# Patient Record
Sex: FEMALE | Race: WHITE | Employment: FULL TIME | URBAN - METROPOLITAN AREA
[De-identification: names, ages, dates, MRNs, and addresses within clinical notes are randomized per-mention and may not be internally consistent; named-entity substitution may affect disease eponyms.]

---

## 2017-07-14 ENCOUNTER — OFFICE VISIT (OUTPATIENT)
Dept: FAMILY MEDICINE | Facility: CLINIC | Age: 37
End: 2017-07-14
Payer: COMMERCIAL

## 2017-07-14 VITALS
HEART RATE: 60 BPM | TEMPERATURE: 98 F | OXYGEN SATURATION: 97 % | SYSTOLIC BLOOD PRESSURE: 105 MMHG | WEIGHT: 202 LBS | HEIGHT: 67 IN | BODY MASS INDEX: 31.71 KG/M2 | DIASTOLIC BLOOD PRESSURE: 67 MMHG

## 2017-07-14 DIAGNOSIS — N63.20 LUMP OF LEFT BREAST: Primary | ICD-10-CM

## 2017-07-14 DIAGNOSIS — Z86.006 HISTORY OF MELANOMA IN SITU: ICD-10-CM

## 2017-07-14 PROCEDURE — 99203 OFFICE O/P NEW LOW 30 MIN: CPT | Performed by: PHYSICIAN ASSISTANT

## 2017-07-14 NOTE — PROGRESS NOTES
SUBJECTIVE:                                                    Kimberli Rizo is a 36 year old female who presents to clinic today for the following health issues:      Breast lump, left. Noticed it this Monday  On Monday, she was hit in the chest with a lacrosse ball. This caused her to palpate her breast and she believes she felt a mass. She intermittently can feel something since.   She does not regularly do self breast exams.  She did have melanoma in the upper quadrants of the left breast- removed February 2016 - no reoccurrence since.   She will intermittently have pain in her left breast over the past year.   Denies any skin changes of the breast.   Denies weight changes.   Maternal grandmother diagnosed with breast cancer in 70's. No others with breast cancer.   She denies any other concerns.       Problem list and histories reviewed & adjusted, as indicated.  Additional history: as documented    Patient Active Problem List   Diagnosis     History of melanoma in situ     Past Surgical History:   Procedure Laterality Date     BIOPSY OF SKIN LESION       TONSILLECTOMY  1997       Social History   Substance Use Topics     Smoking status: Never Smoker     Smokeless tobacco: Never Used     Alcohol use 0.0 oz/week     0 Standard drinks or equivalent per week      Comment: Social     Family History   Problem Relation Age of Onset     Breast Cancer Maternal Grandmother      Heart Failure Paternal Grandmother      Thyroid Cancer Paternal Grandmother      Myocardial Infarction Paternal Grandfather      Hypertension Sister      Melanoma Other      Skin Cancer Other      BCC         No current outpatient prescriptions on file.     Allergies   Allergen Reactions     Seasonal Allergies      BP Readings from Last 3 Encounters:   07/14/17 105/67   05/05/16 105/61   02/03/16 125/70    Wt Readings from Last 3 Encounters:   07/14/17 202 lb (91.6 kg)   05/05/16 197 lb 6.4 oz (89.5 kg)   01/08/16 197 lb 6.4 oz (89.5 kg)     "                Reviewed and updated as needed this visit by clinical staff       Reviewed and updated as needed this visit by Provider         ROS:  Constitutional, breast, musculoskeletal and integumentary systems are negative, except as otherwise noted.    OBJECTIVE:     /67  Pulse 60  Temp 98  F (36.7  C) (Oral)  Ht 5' 7\" (1.702 m)  Wt 202 lb (91.6 kg)  SpO2 97%  BMI 31.64 kg/m2  Body mass index is 31.64 kg/(m^2).  GENERAL: healthy, alert and no distress  NECK: no adenopathy, no asymmetry, masses, or scars and thyroid normal to palpation  BREAST: normal without masses, tenderness or nipple discharge and no palpable axillary masses or adenopathy  MS: no gross musculoskeletal defects noted, no edema  SKIN: no suspicious lesions or rashes    Diagnostic Test Results:  none     ASSESSMENT/PLAN:       ICD-10-CM    1. Lump of left breast N63 MA Diagnostic Digital Bilateral   2. History of melanoma in situ Z87.898      Melanoma removed from left upper breast over 1 year ago. No reoccurrence since.     Patient Instructions   If you develop a lump or persistent pain, may call to schedule mammogram. At this time, I do not feel any specific lump in the area your indicate.       Call [phone] to schedule your diagnostic mammogram.      Ciarra King PA-C  [facility]  "

## 2017-07-14 NOTE — MR AVS SNAPSHOT
After Visit Summary   7/14/2017    Kimberli Rizo    MRN: [id number]           Patient Information     Date Of Birth          1980        Visit Information        Provider Department      7/14/2017 11:20 AM Ciarra King PA-C [facility]        Today's Diagnoses     Lump of left breast    -  1      Care Instructions    If you develop a lump or persistent pain, may call to schedule mammogram. At this time, I do not feel any specific lump in the area your indicate.       Call [phone] to schedule your diagnostic mammogram.          Follow-ups after your visit        Future tests that were ordered for you today     Open Future Orders        Priority Expected Expires Ordered    MA Diagnostic Digital Bilateral Routine  7/14/2018 7/14/2017            Who to contact     If you have questions or need follow up information about today's clinic visit or your schedule please contact [facility] directly at [phone].  Normal or non-critical lab and imaging results will be communicated to you by [employer] Motiongraph[employer]kshart, letter or phone within 4 business days after the clinic has received the results. If you do not hear from us within 7 days, please contact the clinic through [employer] or phone. If you have a critical or abnormal lab result, we will notify you by phone as soon as possible.  Submit refill requests through [employer] or call your pharmacy and they will forward the refill request to us. Please allow 3 business days for your refill to be completed.          Additional Information About Your Visit        SD Motiongraphikshart Information     [employer] gives you secure access to your electronic health record. If you see a primary care provider, you can also send messages to your care team and make appointments. If you have questions, please call your primary care clinic.  If you do not have a primary care provider, please call [phone] and they will assist you.        Care EveryWhere  "ID     This is your Care EveryWhere ID. This could be used by other organizations to access your [facility] medical records  [id number]        Your Vitals Were     Pulse Temperature Height Pulse Oximetry BMI (Body Mass Index)       60 98  F (36.7  C) (Oral) 5' 7\" (1.702 m) 97% 31.64 kg/m2        Blood Pressure from Last 3 Encounters:   07/14/17 105/67   05/05/16 105/61   02/03/16 125/70    Weight from Last 3 Encounters:   07/14/17 202 lb (91.6 kg)   05/05/16 197 lb 6.4 oz (89.5 kg)   01/08/16 197 lb 6.4 oz (89.5 kg)                 Today's Medication Changes          These changes are accurate as of: 7/14/17 11:44 AM.  If you have any questions, ask your nurse or doctor.               Stop taking these medicines if you haven't already. Please contact your care team if you have questions.     order for DME   Stopped by:  Ciarra King PA-C                    Primary Care Provider Office Phone # Fax #    [facility] [phone]       No address on file        Equal Access to Services     DOMINGUEZ MELGAR : Hadii davey montezo Sochristal, waaxda luqadaha, qaybta kaalmada adeegyada, imtiaz christian. So [facility] [phone].    ATENCIÓN: Si habla español, tiene a negrete disposición servicios gratuitos de asistencia lingüística. [facility] [phone].    We comply with applicable federal civil rights laws and [place] laws. We do not discriminate on the basis of race, color, national origin, age, disability sex, sexual orientation or gender identity.            Thank you!     Thank you for choosing [facility]  for your care. Our goal is always to provide you with excellent care. Hearing back from our patients is one way we can continue to improve our services. Please take a few minutes to complete the written survey that you may receive in the mail after your visit with us. Thank you!             Your Updated Medication List - Protect others around you: Learn how " to safely use, store and throw away your medicines at www.disposemymeds.org.      Notice  As of 7/14/2017 11:44 AM    You have not been prescribed any medications.

## 2017-07-14 NOTE — NURSING NOTE
"Chief Complaint   Patient presents with     Breast Mass       Initial /67  Pulse 60  Temp 98  F (36.7  C) (Oral)  Ht 5' 7\" (1.702 m)  Wt 202 lb (91.6 kg)  SpO2 97%  BMI 31.64 kg/m2 Estimated body mass index is 31.64 kg/(m^2) as calculated from the following:    Height as of this encounter: 5' 7\" (1.702 m).    Weight as of this encounter: 202 lb (91.6 kg).  Medication Reconciliation: complete     Nyla Zapata cma      "

## 2017-07-14 NOTE — PATIENT INSTRUCTIONS
If you develop a lump or persistent pain, may call to schedule mammogram. At this time, I do not feel any specific lump in the area your indicate.       Call [phone] to schedule your diagnostic mammogram.

## 2017-08-02 ENCOUNTER — OFFICE VISIT (OUTPATIENT)
Dept: URGENT CARE | Facility: URGENT CARE | Age: 37
End: 2017-08-02
Payer: COMMERCIAL

## 2017-08-02 VITALS
OXYGEN SATURATION: 99 % | DIASTOLIC BLOOD PRESSURE: 76 MMHG | HEART RATE: 72 BPM | WEIGHT: 204 LBS | BODY MASS INDEX: 31.95 KG/M2 | RESPIRATION RATE: 15 BRPM | SYSTOLIC BLOOD PRESSURE: 116 MMHG | TEMPERATURE: 97.4 F

## 2017-08-02 DIAGNOSIS — R07.2 PRECORDIAL PAIN: Primary | ICD-10-CM

## 2017-08-02 DIAGNOSIS — R10.13 ABDOMINAL PAIN, EPIGASTRIC: ICD-10-CM

## 2017-08-02 PROCEDURE — 99213 OFFICE O/P EST LOW 20 MIN: CPT | Performed by: FAMILY MEDICINE

## 2017-08-02 PROCEDURE — 93000 ELECTROCARDIOGRAM COMPLETE: CPT | Performed by: FAMILY MEDICINE

## 2017-08-02 RX ORDER — LORATADINE 10 MG/1
TABLET ORAL
COMMUNITY
Start: 2004-12-03 | End: 2021-06-09

## 2017-08-02 NOTE — NURSING NOTE
"Chief Complaint   Patient presents with     Abdominal Pain     c/o nausea, stomach pain and loose stool since 7/29/17, now having pain on left side        Initial /76  Pulse 72  Temp 97.4  F (36.3  C) (Oral)  Resp 15  Wt 204 lb (92.5 kg)  SpO2 99%  Breastfeeding? No  BMI 31.95 kg/m2 Estimated body mass index is 31.95 kg/(m^2) as calculated from the following:    Height as of 7/14/17: 5' 7\" (1.702 m).    Weight as of this encounter: 204 lb (92.5 kg).  Medication Reconciliation: complete   Burt Phillips MA      "

## 2017-08-02 NOTE — MR AVS SNAPSHOT
After Visit Summary   8/2/2017    Kimberli Rizo    MRN: [id number]           Patient Information     Date Of Birth          1980        Visit Information        Provider Department      8/2/2017 5:20 PM Odalis Clifford MD [facility]        Today's Diagnoses     Precordial pain    -  1    Abdominal pain, epigastric          Care Instructions    Concern for need for rule out acute cardiorespiratory process and also rule out pancreatitis.           Follow-ups after your visit        Who to contact     If you have questions or need follow up information about today's clinic visit or your schedule please contact [facility] directly at [phone].  Normal or non-critical lab and imaging results will be communicated to you by HEXIOhart, letter or phone within 4 business days after the clinic has received the results. If you do not hear from us within 7 days, please contact the clinic through HEXIOhart or phone. If you have a critical or abnormal lab result, we will notify you by phone as soon as possible.  Submit refill requests through [employer] or call your pharmacy and they will forward the refill request to us. Please allow 3 business days for your refill to be completed.          Additional Information About Your Visit        MyChart Information     [employer] gives you secure access to your electronic health record. If you see a primary care provider, you can also send messages to your care team and make appointments. If you have questions, please call your primary care clinic.  If you do not have a primary care provider, please call [phone] and they will assist you.        Care EveryWhere ID     This is your Care EveryWhere ID. This could be used by other organizations to access your [facility] medical records  [id number]        Your Vitals Were     Pulse Temperature Respirations Pulse Oximetry Breastfeeding? BMI (Body Mass Index)    72 97.4  F (36.3  C)  (Oral) 15 99% No 31.95 kg/m2       Blood Pressure from Last 3 Encounters:   08/02/17 116/76   07/14/17 105/67   05/05/16 105/61    Weight from Last 3 Encounters:   08/02/17 204 lb (92.5 kg)   07/14/17 202 lb (91.6 kg)   05/05/16 197 lb 6.4 oz (89.5 kg)              We Performed the Following     EKG 12-lead complete w/read - Clinics        Primary Care Provider Office Phone # Fax #    [facility] [phone]       No address on file        Equal Access to Services     MEGA [facility] HospitalCAYLA : Hadii davey leiva Sochristal, wagregda gris, seraybcar kaalmada gerry, imtiaz kelly . So [facility] [phone].    ATENCIÓN: Si habla español, tiene a negrete disposición servicios gratuitos de asistencia lingüística. [facility] [phone].    We comply with applicable federal civil rights laws and [place] laws. We do not discriminate on the basis of race, color, national origin, age, disability sex, sexual orientation or gender identity.            Thank you!     Thank you for choosing [facility]  for your care. Our goal is always to provide you with excellent care. Hearing back from our patients is one way we can continue to improve our services. Please take a few minutes to complete the written survey that you may receive in the mail after your visit with us. Thank you!             Your Updated Medication List - Protect others around you: Learn how to safely use, store and throw away your medicines at www.disposemymeds.org.          This list is accurate as of: 8/2/17  5:54 PM.  Always use your most recent med list.                   Brand Name Dispense Instructions for use Diagnosis    CLARITIN 10 MG tablet   Generic drug:  loratadine      PRN- Take one tablet daily for allergies

## 2017-08-02 NOTE — PROGRESS NOTES
SUBJECTIVE:                                                    Kimberli Rizo is a 36 year old female who presents to clinic today for the following health issues:      Abdominal Pain and chest pain      Duration: 7/29/17    Description (location/character/radiation): nausea/pain radiates to chest/neck on left side       Associated flank pain: None    Intensity:  moderate    Accompanying signs and symptoms:        Fever/Chills: no        Gas/Bloating: YES       Nausea/vomitting: YES - some nausea.        Diarrhea: no loose stool       Dysuria or Hematuria: no     History (previous similar pain/trauma/previous testing): none    Precipitating or alleviating factors:       Pain worse with eating/BM/urination: no       Pain relieved by BM: no     Therapies tried and outcome: None    LMP:  not applicable    5 days ago stomach started hurting left upper quadrant. Thought it was justfrom eating some jalapenos  Patient tried tums and peptobismal no relief    Past couple of days has still persistedly uncomfortable  Now the whole left side to be hurting even up to her chest now     Denies any possibility of pregnancy declined a pregnancy test  Compa has had a vasectomy.     Constant   Sometimes is more of a stinging in stomach sometimes in the chest  Not really sure if chest pain is exertional or not.   When she eats a little bit seems to make it better for a moment but then gets worse again  Not really sure what makes things better or worse.   diarrhea: no   urinary symptoms:  none   flank pain:  none  fever or chills:  none  weight loss or constitutional symptoms: none  melena, hematochezia, or hematemesis: none  Problem list, Medication list, Allergies, and Medical/Social/Surgical histories reviewed in EPIC and updated as appropriate.      ROS:  General: negative for fever  Resp: negative for chest pain   CV: negative for chest pain  ABD: as above  : negative for dysuria  Neurologic:negative for Headache  Psych:  denies any thoughts of harming self or others.     Constitutional, HEENT, cardiovascular, pulmonary, GI, , musculoskeletal, neuro, skin, endocrine and psych systems are negative, except as otherwise noted.      OBJECTIVE:/76  Pulse 72  Temp 97.4  F (36.3  C) (Oral)  Resp 15  Wt 204 lb (92.5 kg)  SpO2 99%  Breastfeeding? No  BMI 31.95 kg/m2   General:   awake, alert, and cooperative.  NAD.   Head: Normocephalic, atraumatic.  Eyes: Conjunctiva clear, non icteric. YAMIL  Heart: Regular rate and rhythm. No murmur.  Lungs: Chest is clear; no wheezes or rales.  ABD: soft, no tenderness to palpation , no rigidity, guarding or rebound , bowel sounds intact  RECTAL: declined/deferred   Neuro: Alert and oriented - normal speech.       Diagnostic Test Results:  EKG - negative to my review  ASSESSMENT:    ICD-10-CM    1. Precordial pain R07.2 EKG 12-lead complete w/read - Clinics   2. Abdominal pain, epigastric R10.13          PLAN:      Recommend going to the ER for complete rule out  Need rule out acute cardiorespiratory process and rule out pancreatitis.   Patient states pain is moderate to severe although patient seems comfortable here in clinic  If tests negative consider starting PPI.   I advised and recommended ambulance transfer. Patient refused ambulance transfer. Warned about risks to patient and to others on the road and this was discussed in detail however patient still refused.   Patient plans to go to [facility]. A copy of EKG and patient plan given to patient.     Odalis Clifford MD

## 2018-08-10 ENCOUNTER — OFFICE VISIT (OUTPATIENT)
Dept: ORTHOPEDICS | Facility: CLINIC | Age: 38
End: 2018-08-10
Payer: COMMERCIAL

## 2018-08-10 ENCOUNTER — RADIANT APPOINTMENT (OUTPATIENT)
Dept: GENERAL RADIOLOGY | Facility: CLINIC | Age: 38
End: 2018-08-10
Attending: PEDIATRICS
Payer: COMMERCIAL

## 2018-08-10 VITALS
BODY MASS INDEX: 30.61 KG/M2 | WEIGHT: 195 LBS | HEIGHT: 67 IN | SYSTOLIC BLOOD PRESSURE: 104 MMHG | DIASTOLIC BLOOD PRESSURE: 70 MMHG

## 2018-08-10 DIAGNOSIS — M25.552 PAIN IN JOINT INVOLVING LEFT PELVIC REGION AND THIGH: ICD-10-CM

## 2018-08-10 DIAGNOSIS — M25.552 LEFT HIP PAIN: Primary | ICD-10-CM

## 2018-08-10 PROCEDURE — 99214 OFFICE O/P EST MOD 30 MIN: CPT | Performed by: PEDIATRICS

## 2018-08-10 PROCEDURE — 73502 X-RAY EXAM HIP UNI 2-3 VIEWS: CPT

## 2018-08-10 NOTE — PROGRESS NOTES
Sports Medicine Clinic Visit    PCP: New Shearer, Renetta    Kimberli Rizo is a 37 year old female who is seen  as a self referral presenting with left hip pain.    Injury: pain/ limping started after running 1/2 [place].  Had some bilateral lateral hip pain past few miles; race pace was a little faster than training.  Noted this pain after the race. Was limping. Next morning with getting out of bed significantly more pain. Used crutches for part of day. Later that day had massage, still limping, was walking a little better.      Location of Pain: left anterior hip was start of pain, now whole hip is painful  Duration of Pain: 7/28/18 or 2 week(s)  Rating of Pain at worst: 8/10  Rating of Pain Currently: 5/10  Symptoms are better with: Ibu, Heat, Rest and Massage with short term relief (about 30 mins)  Symptoms are worse with: flexion of hip, running and standing  Additional Features:   Positive: catching and weakness   Negative: swelling, bruising, locking and numbness  Other evaluation and/or treatments so far consists of: stretching, hot baths, Epson salts, foam rolls,   Prior History of related problems: IT Band issues at knee in high school,    Social History: hoping to run TCM, first marathon,     Review of Systems  Musculoskeletal: as above  Remainder of review of systems is negative including constitutional, CV, pulmonary, GI, Skin and Neurologic except as noted in HPI or medical history.    Past Medical History:   Diagnosis Date     Malignant melanoma (H)      NO ACTIVE PROBLEMS      Past Surgical History:   Procedure Laterality Date     BIOPSY OF SKIN LESION       TONSILLECTOMY  1997     Family History   Problem Relation Age of Onset     Breast Cancer Maternal Grandmother      Heart Failure Paternal Grandmother      Thyroid Cancer Paternal Grandmother      Myocardial Infarction Paternal Grandfather      Hypertension Sister      Melanoma Other      Skin Cancer Other      BCC     Social History  "    Social History     Marital status:      Spouse name: N/A     Number of children: N/A     Years of education: N/A     Occupational History     Not on file.     Social History Main Topics     Smoking status: Never Smoker     Smokeless tobacco: Never Used     Alcohol use 0.0 oz/week     0 Standard drinks or equivalent per week      Comment: Social     Drug use: No     Sexual activity: Yes     Partners: Male     Birth control/ protection: Male Surgical      Comment:  Vas     Other Topics Concern     Not on file     Social History Narrative       Objective  /70 (BP Location: Right arm, Patient Position: Chair, Cuff Size: Adult Regular)  Ht 5' 7\" (1.702 m)  Wt 195 lb (88.5 kg)  BMI 30.54 kg/m2      GENERAL APPEARANCE: healthy, alert and no distress   GAIT: antalgic  SKIN: no suspicious lesions or rashes  NEURO: Normal strength and tone, mentation intact and speech normal  PSYCH:  mentation appears normal and affect normal/bright  HEENT: no scleral icterus  CV: no extremity edema  RESP: nonlabored breathing    Left hip exam    Inspection:        no edema or ecchymosis in hip area    ROM:       Full active and passive ROM   Mild discomfort end of ER    Strength:        flexion 4+/5       abduction 4/5       adduction 4+/5  Min pain with flexion    Tender:        Pubis left    Non Tender:        remainder of hip area    Sensation:        grossly intact in hip and thigh    Skin:       well perfused       capillary refill brisk    Special Tests:        neg (-) CARISSA       neg (-) FADIR       neg (-) scour  Quad, hip flexor stretching without change in pain          Radiology  Visualized radiographs of left hip/pelvis obtained today, and reviewed the images with the patient.  Impression: no clear bony injury.      Assessment:  1. Left hip pain        Plan:  Discussed the assessment with the patient. Pain primarily with WB; consider bony stress injury. Other consideration is soft tissue, " muscular/tendinous. Less likely left hip joint source given exam.  Icing, over-the-counter medication as needed.  Discussed acetaminophen preferred over ibuprofen at this point, with considerations noted.  Discussed rehab, rest from running, and symptom management over the next couple of weeks; versus additional imaging to evaluate for stress injury.  This is her planned first marathon, and with under 2 months until the race, will obtain MRI of the area to evaluate for bony stress injury.  If there is one, I do not anticipate she will be able to run the race.  If no bone or joint concerns, then may be able to rehabilitate and still try to participate pending her course.  Avoid weightbearing, with use of crutches.  MRI of the left hip next  Follow up: call with results of MRI.  Questions answered. The patient indicates understanding of these issues and agrees with the plan.    Alistair Doty DO, CAQ          Disclaimer: This note consists of symbols derived from keyboarding, dictation and/or voice recognition software. As a result, there may be errors in the script that have gone undetected. Please consider this when interpreting information found in this chart.

## 2018-08-10 NOTE — MR AVS SNAPSHOT
After Visit Summary   8/10/2018    Kimberli Rizo    MRN: [id number]           Patient Information     Date Of Birth          1980        Visit Information        Provider Department      8/10/2018 11:20 AM Alistair Doty, DO [facility]        Today's Diagnoses     Left hip pain    -  1      Care Instructions     Advanced imaging is done by appointment. Some insurance require a prior authorization to be completed which may delay the time until you are able to schedule your appointment. Please call [facility]: [phone] to schedule your MRI.  Depending on your availability you can usually schedule within the next 1-2 days.    The clinic will call you with results, if you have not heard from the clinic within 3-4 days following your MRI please contact us at the number listed below.     If you have any further questions for your physician or physician s care team you can call [phone] and use option 3 to leave a voice message. Calls received during business hours will be returned same day.                  Follow-ups after your visit        Future tests that were ordered for you today     Open Future Orders        Priority Expected Expires Ordered    MR Hip Left w/o Contrast Routine  8/10/2019 8/10/2018            Who to contact     If you have questions or need follow up information about today's clinic visit or your schedule please contact [facility] directly at [phone].  Normal or non-critical lab and imaging results will be communicated to you by MyChart, letter or phone within 4 business days after the clinic has received the results. If you do not hear from us within 7 days, please contact the clinic through MyChart or phone. If you have a critical or abnormal lab result, we will notify you by phone as soon as possible.  Submit refill requests through [employer] or call your pharmacy  "and they will forward the refill request to us. Please allow 3 business days for your refill to be completed.          Additional Information About Your Visit        [employer] Information     [employer] gives you secure access to your electronic health record. If you see a primary care provider, you can also send messages to your care team and make appointments. If you have questions, please call your primary care clinic.  If you do not have a primary care provider, please call [phone] and they will assist you.        Care EveryWhere ID     This is your Care EveryWhere ID. This could be used by other organizations to access your [facility] records  [id number]        Your Vitals Were     Height BMI (Body Mass Index)                5' 7\" (1.702 m) 30.54 kg/m2           Blood Pressure from Last 3 Encounters:   08/10/18 104/70   08/02/17 116/76   07/14/17 105/67    Weight from Last 3 Encounters:   08/10/18 195 lb (88.5 kg)   08/02/17 204 lb (92.5 kg)   07/14/17 202 lb (91.6 kg)               Primary Care Provider Office Phone # Fax #    Clinic  [place] [phone]       No address on file        Equal Access to Services     DOMINGUEZ MELGAR AH: Hadii davey montezo Soruthannali, waaxda luqadaha, qaybta kaalmada adeegyada, imtiaz christian. So [facility] [phone].    ATENCIÓN: Si habla español, tiene a negrete disposición servicios gratuitos de asistencia lingüística. Llame al [phone].    We comply with applicable federal civil rights laws and [place] laws. We do not discriminate on the basis of race, color, national origin, age, disability, sex, sexual orientation, or gender identity.            Thank you!     Thank you for choosing [facility]  for your care. Our goal is always to provide you with excellent care. Hearing back from our patients is one way we can continue to improve our services. Please take a few minutes to complete the written survey " that you may receive in the mail after your visit with us. Thank you!             Your Updated Medication List - Protect others around you: Learn how to safely use, store and throw away your medicines at www.disposemymeds.org.          This list is accurate as of 8/10/18 12:36 PM.  Always use your most recent med list.                   Brand Name Dispense Instructions for use Diagnosis    CLARITIN 10 MG tablet   Generic drug:  loratadine      PRN- Take one tablet daily for allergies

## 2018-08-10 NOTE — LETTER
8/10/2018         RE: Kimberli Rizo  [id number] [address] [facility] [id number]        Dear Colleague,    Thank you for referring your patient, Kimberli Rizo, to the [facility]. Please see a copy of my visit note below.    Sports Medicine Clinic Visit    PCP: [place], [facility]    Kimberli Rizo is a 37 year old female who is seen  as a self referral presenting with left hip pain.    Injury: pain/ limping started after running 1/2 [place].  Had some bilateral lateral hip pain past few miles; race pace was a little faster than training.  Noted this pain after the race. Was limping. Next morning with getting out of bed significantly more pain. Used crutches for part of day. Later that day had massage, still limping, was walking a little better.      Location of Pain: left anterior hip was start of pain, now whole hip is painful  Duration of Pain: 7/28/18 or 2 week(s)  Rating of Pain at worst: 8/10  Rating of Pain Currently: 5/10  Symptoms are better with: Ibu, Heat, Rest and Massage with short term relief (about 30 mins)  Symptoms are worse with: flexion of hip, running and standing  Additional Features:   Positive: catching and weakness   Negative: swelling, bruising, locking and numbness  Other evaluation and/or treatments so far consists of: stretching, hot baths, Epson salts, foam rolls,   Prior History of related problems: IT Band issues at knee in high school,    Social History: hoping to run TCM, first marathon,     Review of Systems  Musculoskeletal: as above  Remainder of review of systems is negative including constitutional, CV, pulmonary, GI, Skin and Neurologic except as noted in HPI or medical history.    Past Medical History:   Diagnosis Date     Malignant melanoma (H)      NO ACTIVE PROBLEMS      Past Surgical History:   Procedure Laterality Date     BIOPSY OF SKIN LESION       TONSILLECTOMY  1997     Family History   Problem Relation Age of Onset      "Breast Cancer Maternal Grandmother      Heart Failure Paternal Grandmother      Thyroid Cancer Paternal Grandmother      Myocardial Infarction Paternal Grandfather      Hypertension Sister      Melanoma Other      Skin Cancer Other      BCC     Social History     Social History     Marital status:      Spouse name: N/A     Number of children: N/A     Years of education: N/A     Occupational History     Not on file.     Social History Main Topics     Smoking status: Never Smoker     Smokeless tobacco: Never Used     Alcohol use 0.0 oz/week     0 Standard drinks or equivalent per week      Comment: Social     Drug use: No     Sexual activity: Yes     Partners: Male     Birth control/ protection: Male Surgical      Comment:  Vas     Other Topics Concern     Not on file     Social History Narrative       Objective  /70 (BP Location: Right arm, Patient Position: Chair, Cuff Size: Adult Regular)  Ht 5' 7\" (1.702 m)  Wt 195 lb (88.5 kg)  BMI 30.54 kg/m2      GENERAL APPEARANCE: healthy, alert and no distress   GAIT: antalgic  SKIN: no suspicious lesions or rashes  NEURO: Normal strength and tone, mentation intact and speech normal  PSYCH:  mentation appears normal and affect normal/bright  HEENT: no scleral icterus  CV: no extremity edema  RESP: nonlabored breathing    Left hip exam    Inspection:        no edema or ecchymosis in hip area    ROM:       Full active and passive ROM   Mild discomfort end of ER    Strength:        flexion 4+/5       abduction 4/5       adduction 4+/5  Min pain with flexion    Tender:        Pubis left    Non Tender:        remainder of hip area    Sensation:        grossly intact in hip and thigh    Skin:       well perfused       capillary refill brisk    Special Tests:        neg (-) CARISSA       neg (-) FADIR       neg (-) scour  Quad, hip flexor stretching without change in pain          Radiology  Visualized radiographs of left hip/pelvis obtained today, and reviewed " the images with the patient.  Impression: no clear bony injury.      Assessment:  1. Left hip pain        Plan:  Discussed the assessment with the patient. Pain primarily with WB; consider bony stress injury. Other consideration is soft tissue, muscular/tendinous. Less likely left hip joint source given exam.  Icing, over-the-counter medication as needed.  Discussed acetaminophen preferred over ibuprofen at this point, with considerations noted.  Discussed rehab, rest from running, and symptom management over the next couple of weeks; versus additional imaging to evaluate for stress injury.  This is her planned first marathon, and with under 2 months until the race, will obtain MRI of the area to evaluate for bony stress injury.  If there is one, I do not anticipate she will be able to run the race.  If no bone or joint concerns, then may be able to rehabilitate and still try to participate pending her course.  Avoid weightbearing, with use of crutches.  MRI of the left hip next  Follow up: call with results of MRI.  Questions answered. The patient indicates understanding of these issues and agrees with the plan.    Alistair Doty DO, CAQ          Disclaimer: This note consists of symbols derived from keyboarding, dictation and/or voice recognition software. As a result, there may be errors in the script that have gone undetected. Please consider this when interpreting information found in this chart.        Again, thank you for allowing me to participate in the care of your patient.        Sincerely,        Alistair Doty DO

## 2018-08-10 NOTE — PATIENT INSTRUCTIONS
Advanced imaging is done by appointment. Some insurance require a prior authorization to be completed which may delay the time until you are able to schedule your appointment. Please call [place]: [phone] to schedule your MRI.  Depending on your availability you can usually schedule within the next 1-2 days.    The clinic will call you with results, if you have not heard from the clinic within 3-4 days following your MRI please contact us at the number listed below.     If you have any further questions for your physician or physician s care team you can call [phone] and use option 3 to leave a voice message. Calls received during business hours will be returned same day.

## 2018-08-16 ENCOUNTER — RADIANT APPOINTMENT (OUTPATIENT)
Dept: MRI IMAGING | Facility: CLINIC | Age: 38
End: 2018-08-16
Attending: PEDIATRICS
Payer: COMMERCIAL

## 2018-08-16 DIAGNOSIS — M25.552 LEFT HIP PAIN: ICD-10-CM

## 2018-08-16 PROCEDURE — 73721 MRI JNT OF LWR EXTRE W/O DYE: CPT | Mod: TC

## 2018-08-17 ENCOUNTER — TELEPHONE (OUTPATIENT)
Dept: ORTHOPEDICS | Facility: CLINIC | Age: 38
End: 2018-08-17

## 2018-08-17 NOTE — TELEPHONE ENCOUNTER
Early left femoral neck stress injury; no clear stress fracture. Appears to be more on compression side of bone, so this is lower risk but still susceptible to further injury/fracture if not addressed.  Unfortunately, advise no running. She may do low/no impact cross training activities (e.g., biking) if pain free. At this point I don't think she'll be able to run the marathon this year. Also, I don't think she needs crutches at this point, if walking comfortably.  Would recheck in 2-3 weeks, and obtain plain films left hip (2 views ok, AP and lateral). Can refer to PT at any point as well, now or wait until recheck.  Thanks.  Alistair Doty, , CAQ

## 2018-08-17 NOTE — TELEPHONE ENCOUNTER
Results for orders placed or performed in visit on 08/16/18   MR Hip Left w/o Contrast    Narrative    MR HIP LEFT WITHOUT CONTRAST 8/16/2018 8:02 AM    HISTORY:  Evaluate for bony stress injury. Left hip pain.    TECHNIQUE:  Multiplanar, multisequence without contrast.    FINDINGS:   Osseous and Cartilaginous Structures:  There is mild edema along the  medial aspect of the left femoral neck, consistent with stress  reaction or developing stress fracture. No discrete fracture line is  seen at this time. The edema is seen on series 2 images 25-30. No  significant hip osteoarthritis. Small focus of subchondral cystic  change at the lateral aspect of the left sacroiliac joint.    Acetabular Labrum: No juxtaacetabular cyst.  No obvious labral tear is  appreciated, allowing for the large FOV technique.  If indicated  clinically, MR arthrography would be considered the study of choice in  this regard.    Hip joint space:  No significant overall joint effusion.     Trochanteric and Iliopsoas Bursae: No fluid collection in the  trochanteric or iliopsoas bursae.    Common Hamstring Tendon: No evidence of tear or significant  tendinosis.    Additional Findings: The gluteus medius and minimus tendons appear  unremarkable.      Impression    IMPRESSION:  Left femoral neck stress reaction versus developing  stress fracture.    WOODY BAIN MD

## 2018-08-17 NOTE — TELEPHONE ENCOUNTER
Called and spoke with patient.  Discussed results and recommendations.  She is agreeable to rest from running.  Did discuss the use of PT.  Will wait for now.  Will follow up in 2-3 weeks.  Kassandra Liriano MS ATC

## 2018-08-20 ENCOUNTER — MYC MEDICAL ADVICE (OUTPATIENT)
Dept: ORTHOPEDICS | Facility: CLINIC | Age: 38
End: 2018-08-20

## 2018-09-05 ENCOUNTER — TRANSFERRED RECORDS (OUTPATIENT)
Dept: HEALTH INFORMATION MANAGEMENT | Facility: CLINIC | Age: 38
End: 2018-09-05

## 2020-02-24 ENCOUNTER — HEALTH MAINTENANCE LETTER (OUTPATIENT)
Age: 40
End: 2020-02-24

## 2020-12-13 ENCOUNTER — HEALTH MAINTENANCE LETTER (OUTPATIENT)
Age: 40
End: 2020-12-13

## 2021-04-08 ENCOUNTER — E-VISIT (OUTPATIENT)
Dept: URGENT CARE | Facility: URGENT CARE | Age: 41
End: 2021-04-08
Payer: COMMERCIAL

## 2021-04-08 ENCOUNTER — ANCILLARY PROCEDURE (OUTPATIENT)
Dept: GENERAL RADIOLOGY | Facility: CLINIC | Age: 41
End: 2021-04-08
Attending: FAMILY MEDICINE
Payer: COMMERCIAL

## 2021-04-08 ENCOUNTER — OFFICE VISIT (OUTPATIENT)
Dept: URGENT CARE | Facility: URGENT CARE | Age: 41
End: 2021-04-08
Payer: COMMERCIAL

## 2021-04-08 VITALS
OXYGEN SATURATION: 95 % | TEMPERATURE: 100.8 F | DIASTOLIC BLOOD PRESSURE: 81 MMHG | SYSTOLIC BLOOD PRESSURE: 120 MMHG | RESPIRATION RATE: 18 BRPM | HEART RATE: 106 BPM

## 2021-04-08 DIAGNOSIS — U07.1 CLINICAL DIAGNOSIS OF COVID-19: Primary | ICD-10-CM

## 2021-04-08 DIAGNOSIS — J18.9 PNEUMONIA OF RIGHT LOWER LOBE DUE TO INFECTIOUS ORGANISM: ICD-10-CM

## 2021-04-08 DIAGNOSIS — U07.1 2019 NOVEL CORONAVIRUS DISEASE (COVID-19): Primary | ICD-10-CM

## 2021-04-08 PROCEDURE — 71046 X-RAY EXAM CHEST 2 VIEWS: CPT | Performed by: RADIOLOGY

## 2021-04-08 PROCEDURE — 99214 OFFICE O/P EST MOD 30 MIN: CPT | Performed by: FAMILY MEDICINE

## 2021-04-08 RX ORDER — AZITHROMYCIN 250 MG/1
TABLET, FILM COATED ORAL
Qty: 6 TABLET | Refills: 0 | Status: SHIPPED | OUTPATIENT
Start: 2021-04-08 | End: 2021-06-09

## 2021-04-08 RX ORDER — AMOXICILLIN 875 MG
875 TABLET ORAL 2 TIMES DAILY
Qty: 20 TABLET | Refills: 0 | Status: SHIPPED | OUTPATIENT
Start: 2021-04-08 | End: 2021-04-18

## 2021-04-08 RX ORDER — ALBUTEROL SULFATE 90 UG/1
2 AEROSOL, METERED RESPIRATORY (INHALATION) ONCE
Status: DISCONTINUED | OUTPATIENT
Start: 2021-04-08 | End: 2022-03-29

## 2021-04-08 ASSESSMENT — PAIN SCALES - GENERAL: PAINLEVEL: NO PAIN (0)

## 2021-04-08 NOTE — PATIENT INSTRUCTIONS
Dear Kimberli Rizo,    We are sorry you are not feeling well. Based on the responses you provided, it is recommended that you be seen in-person in urgent care so we can better evaluate your symptoms. Please click here to find the nearest urgent care location to you.   You will not be charged for this Visit. Thank you for trusting us with your care.    Raman Pepe PA-C

## 2021-04-08 NOTE — PROGRESS NOTES
Chief complaint: worsening cough     Symptoms started about 2 weeks initially was just a back ache and body aches and fatigue  Then runny nose itchy ears  Was diagnosed with covid  Then had more cold symptoms    Over the past 2 days noticed that she is having coughing fits  If she breathes in deep coughing fits  Exerting coughing fits    Low grade fevers in the beginning   Yesterday didn't have a fever  But then today has fevers       Allergies   Allergen Reactions     Seasonal Allergies        Past Medical History:   Diagnosis Date     Malignant melanoma (H)      NO ACTIVE PROBLEMS        loratadine (CLARITIN) 10 MG tablet, PRN- Take one tablet daily for allergies    No current facility-administered medications on file prior to visit.       Social History     Tobacco Use     Smoking status: Never Smoker     Smokeless tobacco: Never Used   Substance Use Topics     Alcohol use: Yes     Alcohol/week: 0.0 standard drinks     Comment: Social     Drug use: No       ROS:  review of systems negative except for noted above.   No thoughts of harming self or others.     OBJECTIVE:  /81   Pulse 106   Temp 100.8  F (38.2  C) (Tympanic)   Resp 18   SpO2 95%    General:   awake, alert, and cooperative.  NAD.   Head: Normocephalic, atraumatic.  Eyes: Conjunctiva clear,   Heart: Regular rate and rhythm. No murmur.  Lungs: Chest is clear; bronchial breath sounds bilaterally   Abdomen: soft non-tender.  Neuro: Alert and oriented - normal speech.  MS: Using extremities freely  PSYCH:  Normal affect, normal speech  SKIN: no obvious rashes    Diagnostic Test Results:  Results for orders placed or performed in visit on 04/08/21 (from the past 24 hour(s))   XR Chest 2 Views    Narrative    CHEST TWO VIEWS  4/8/2021 2:04 PM     HISTORY: 2019 novel coronavirus disease (COVID-19).    COMPARISON: None.      Impression    IMPRESSION: PA and lateral views of the chest. A few patchy airspace  opacities are noted at the right lung base.  Lungs are otherwise clear.  Heart is normal in size. No effusions are evident. No pneumothorax.    ELVA KUNZ MD       ASSESSMENT:    ICD-10-CM    1. 2019 novel coronavirus disease (COVID-19)  U07.1 XR Chest 2 Views     amoxicillin (AMOXIL) 875 MG tablet     azithromycin (ZITHROMAX) 250 MG tablet     albuterol (PROAIR HFA/PROVENTIL HFA/VENTOLIN HFA) 108 (90 Base) MCG/ACT inhaler 2 puff   2. Pneumonia of right lower lobe due to infectious organism  J18.9 amoxicillin (AMOXIL) 875 MG tablet     azithromycin (ZITHROMAX) 250 MG tablet       PLAN:   Urgent Care course:    Patient given 2 puffs of albuterol here minimal relief.  Sent for CHEST XRAY  To my personal review I see right lower lobe pneumonia  Prescribed with amoxicillin and zithromax  May still try albuterol as needed cough.  If with any symptoms of chest pain or shortness of breath, lightheadedness, palpitations, feeling like passing out or change and worsening in the quality of your symptoms, please proceed to ER. Recommend follow up with PCP in a few days for re-evaluation.   Continue self isolation until symptoms improve for 24 hours.  She declines ER at this time. Oxygen level is good and patient feels ok to monitor at home  Alarm signs or symptoms discussed, if present recommend go to ER     Abnormal chest xray recommend recheck in 6-8 weeks to assure resolution. Differentials discussed with patient and patient voiced understanding  She states she will discuss with her primary care provider and follow up with a primary care provider       Advised about symptoms which might herald more serious problems.        Odalis Clifford MD

## 2021-04-09 ENCOUNTER — TRANSFERRED RECORDS (OUTPATIENT)
Dept: HEALTH INFORMATION MANAGEMENT | Facility: CLINIC | Age: 41
End: 2021-04-09

## 2021-04-11 LAB
ALT SERPL-CCNC: 36 IU/L (ref 12–68)
AST SERPL-CCNC: 33 U/L (ref 15–37)
CREAT SERPL-MCNC: 0.69 MG/DL (ref 0.55–1.02)
GFR SERPL CREATININE-BSD FRML MDRD: >60 ML/MIN
GLUCOSE SERPL-MCNC: 95 MG/DL (ref 70–110)
POTASSIUM SERPL-SCNC: 3.6 MMOL/L (ref 3.5–5.1)

## 2021-04-14 ENCOUNTER — TELEPHONE (OUTPATIENT)
Dept: URGENT CARE | Facility: URGENT CARE | Age: 41
End: 2021-04-14

## 2021-04-14 NOTE — TELEPHONE ENCOUNTER
Called to check up on patient. Unable to reach.  Patient needs a follow up with a primary care provider for follow up hopsitalization   Please help set patient up with a post-hospitalization follow up with primary care provider  Thank you  Odalis Clifford M.D.

## 2021-04-15 NOTE — TELEPHONE ENCOUNTER
Left msg for pt to return our call so that we may help her set up a follow up appt.     Esther Crain, [place]  Staff

## 2021-04-17 ENCOUNTER — HEALTH MAINTENANCE LETTER (OUTPATIENT)
Age: 41
End: 2021-04-17

## 2021-06-08 ENCOUNTER — E-VISIT (OUTPATIENT)
Dept: URGENT CARE | Facility: CLINIC | Age: 41
End: 2021-06-08
Payer: COMMERCIAL

## 2021-06-08 DIAGNOSIS — R21 RASH: Primary | ICD-10-CM

## 2021-06-08 PROCEDURE — 99207 PR NON-BILLABLE SERV PER CHARTING: CPT | Performed by: PHYSICIAN ASSISTANT

## 2021-06-09 ENCOUNTER — OFFICE VISIT (OUTPATIENT)
Dept: FAMILY MEDICINE | Facility: CLINIC | Age: 41
End: 2021-06-09
Payer: COMMERCIAL

## 2021-06-09 VITALS
OXYGEN SATURATION: 99 % | DIASTOLIC BLOOD PRESSURE: 69 MMHG | TEMPERATURE: 97.9 F | HEART RATE: 70 BPM | HEIGHT: 68 IN | SYSTOLIC BLOOD PRESSURE: 113 MMHG | WEIGHT: 218.4 LBS | BODY MASS INDEX: 33.1 KG/M2

## 2021-06-09 DIAGNOSIS — L30.9 DERMATITIS: Primary | ICD-10-CM

## 2021-06-09 PROCEDURE — 99213 OFFICE O/P EST LOW 20 MIN: CPT | Performed by: NURSE PRACTITIONER

## 2021-06-09 RX ORDER — TRIAMCINOLONE ACETONIDE 1 MG/G
CREAM TOPICAL 2 TIMES DAILY
Qty: 80 G | Refills: 0 | Status: SHIPPED | OUTPATIENT
Start: 2021-06-09 | End: 2022-03-07

## 2021-06-09 RX ORDER — METHYLPREDNISOLONE 4 MG
TABLET, DOSE PACK ORAL
Qty: 21 TABLET | Refills: 0 | Status: SHIPPED | OUTPATIENT
Start: 2021-06-09 | End: 2022-03-07

## 2021-06-09 ASSESSMENT — MIFFLIN-ST. JEOR: SCORE: 1699.16

## 2021-06-09 NOTE — PATIENT INSTRUCTIONS
Dear Kimberli Rizo,    We are sorry you are not feeling well. Based on the responses you provided, it is recommended that you be seen in-person in urgent care so we can better evaluate your symptoms. Please click here to find the nearest urgent care location to you.   You will not be charged for this Visit. Thank you for trusting us with your care.    Migue Magaña PA-C    Dear Kimberli Rizo?     After reviewing your responses, I am unable to make a diagnosis that can be treated online.    You will not be charged for this eVisit.     We are dedicated to helping you achieve your best health and would like to see you in one of our many clinic locations - a primary care provider would be ideal for your concern.    Please use [employer] to schedule a visit with a provider or call [phone]) to schedule at any of our locations.    Thanks for choosing?us?as your health care partner,?   ?   Migue Magaña PA-C?

## 2021-06-09 NOTE — PATIENT INSTRUCTIONS
"  Patient Education     Contact Dermatitis  Contact dermatitis is a skin rash caused by something that touches the skin and makes it irritated and inflamed. Your skin may be red, swollen, dry, and may be cracked. Blisters may form and ooze. The rash will itch.   Contact dermatitis often forms on the face and neck, backs of hands, forearms, genitals, and lower legs. But it can affect any area.   People can get contact dermatitis from lots of sources. These include:    Plants such as poison ivy, oak, or sumac    Chemicals in hair dyes and rinses, soaps, solvents, waxes, fingernail polish, and deodorants     Jewelry or watchbands made of nickel or cobalt  Contact dermatitis is not passed from person to person.  Talk with your healthcare provider about what may have caused the rash. A type of allergy testing called \"patch testing\" may be used to discover what you are allergic to. You will need to stay away from the source of the rash in the future to prevent it from coming back.   Treatment is done to ease itching and prevent the rash from coming back. The rash should go away in a few days to a few weeks.   Home care  Your healthcare provider may prescribe medicine to ease swelling and itching. Follow all instructions when using these medicines.   General care    Stay away from anything that heats up your skin, such as hot showers or baths, or direct sunlight. This can make itching worse.    Apply cold compresses to soothe your sores to help ease your symptoms. Do this for 30 minutes 3 to 4 times a day. You can make a cold compress by soaking a cloth in cold water. Squeeze out excess water. You can add colloidal oatmeal to the water to help reduce itching. For severe itching in a small area, apply an ice pack wrapped in a thin towel. Do this for 20 minutes 3 to 4 times a day.    You can also try wet dressings. One way to do this is to wear a wet piece of clothing under a dry one. Wear a damp shirt under a dry shirt if " your upper body is affected. This can relieve itching and prevent you from scratching the affected area.    You can also help ease large areas of itching by taking a lukewarm bath with colloidal oatmeal added to the water.    Use hydrocortisone cream for redness and irritation, unless another medicine was prescribed. Calamine lotion can also relieve mild symptoms.    Use oral diphenhydramine to help reduce itching. You can buy this antihistamine at drugstores and grocery stores. It can make you sleepy, so use lower doses during the daytime. Don't use diphenhydramine if you have glaucoma or have trouble urinating because of an enlarged prostate.    If a plant causes your rash, make sure to wash your skin and the clothes you were wearing when you came into contact with the plant. This is to wash away the plant oils that gave you the rash and prevent more or worse symptoms. If you have a pet that's been outdoors, its fur may also have oil from the plant. Bathe your pet with soap or shampoo.    Stay away from the substance or object that causes your symptoms. If you can t stay away from it, wear gloves or some other type of protection    Follow-up care  Follow up with your healthcare provider, or as advised.  When to seek medical advice  Call your healthcare provider or seek medical attention right away if any of these occur:     Spreading of the rash to other parts of your body    Severe swelling of your face, eyelids, mouth, throat or tongue    Trouble urinating due to swelling in the genital area    Fever of 100.4 F (38 C) or higher, or as advised by your provider    Redness or swelling that gets worse    Pain that gets worse    Foul-smelling fluid leaking from the skin    Yellow-brown crusts on the open blisters  [employer] last reviewed this educational content on 8/1/2019 2000-2021 [employer]. All rights reserved. This information is not intended as a substitute for professional medical care.  Always follow your healthcare professional's instructions.

## 2021-06-09 NOTE — PROGRESS NOTES
Assessment & Plan     (L30.9) Dermatitis  (primary encounter diagnosis)  Comment: New, onset 1 day ago  Plan: triamcinolone (KENALOG) 0.1 % external cream,         methylPREDNISolone (MEDROL DOSEPAK) 4 MG tablet        therapy pack        Instructed patient to use a thin layer of triamcinolone to face. She may continue to use benadryl as needed. Notify clinic if no improvement after a few days or any worsening symptoms.       Return in about 1 week (around 6/16/2021) for worsening symptoms or failure to improve.    IVY Henning CNP  [facility]        Yue Ag is a 40 year old who presents for the following health issues     HPI     Rash  Onset/Duration: started yesterday  Description  Location: Arms face and neck  Character: linear, round, blotchy, raised, burning, red  Itching: moderate  Intensity:  moderate  Progression of Symptoms:  worsening  Accompanying signs and symptoms:   Fever: no  Body aches or joint pain: no  Sore throat symptoms: no  Recent cold symptoms: no  History:           Previous episodes of similar rash: yes  New exposures:  Old Latex paint  Recent travel: no  Exposure to similar rash: no  Precipitating or alleviating factors: none  Therapies tried and outcome: Triamcinolone acetonide cream        Has had this similar rash on her face in the past in 2015 after working with old latex paint. She was treated with triamcinolone at that time with improvement. She started using the same triamcinolone cream this time with mild improvement with itching, but not swelling or redness. She worked with old latex paint 2 days ago and did get some on her arm but not her face. The rash started under her chin early yesterday morning and then spread to her left cheek and both arms later in the day. Reports it is itchy and warm but not painful or draining. Has also tried benadryl and hydrocortisone cream with no improvement.        Review of Systems   CONSTITUTIONAL:  "NEGATIVE for fever, chills, change in weight  INTEGUMENTARY/SKIN: See HPI  EYES: NEGATIVE for vision changes or irritation  ENT/MOUTH: NEGATIVE for ear, mouth and throat problems  RESP: NEGATIVE for significant cough, SOB, wheezing, or difficulty swallowing      Objective    /69   Pulse 70   Temp 97.9  F (36.6  C) (Oral)   Ht 1.727 m (5' 8\")   Wt 99.1 kg (218 lb 6.4 oz)   SpO2 99%   BMI 33.21 kg/m    Body mass index is 33.21 kg/m .  Physical Exam   GENERAL: healthy, alert and no distress  EYES: Eyes grossly normal to inspection  RESP: lungs clear to auscultation - no rales, rhonchi or wheezes  CV: regular rate and rhythm, normal S1 S2, no S3 or S4, no murmur, click or rub, no peripheral edema and peripheral pulses strong  SKIN: Raised, erythematous rash noted on left face and bilateral arms (L>R).   PSYCH: mentation appears normal, affect normal/bright        "

## 2021-07-11 ENCOUNTER — E-VISIT (OUTPATIENT)
Dept: FAMILY MEDICINE | Facility: CLINIC | Age: 41
End: 2021-07-11
Payer: COMMERCIAL

## 2021-07-11 DIAGNOSIS — T63.441A BEE STING REACTION, ACCIDENTAL OR UNINTENTIONAL, INITIAL ENCOUNTER: Primary | ICD-10-CM

## 2021-07-11 PROCEDURE — 99421 OL DIG E/M SVC 5-10 MIN: CPT | Performed by: FAMILY MEDICINE

## 2021-07-11 NOTE — PATIENT INSTRUCTIONS
Dear Kimberli Rizo    The swelling in your hand appears to be due to a local reaction to the bee sting.  The best thing to help with the swelling is an antihistamine such as Benadryl.  You may also use a cold pack to the hand as well.  What I would recommend taking 50 mg every 4 hours until swelling improves.  Could try a topical hydrocortisone cream if you are having some itching from the swelling.  Please be sure to follow-up if you developing any shortness of breath, wheezing, throat swelling, tongue swelling, or difficulty swallowing.    Thanks for choosing us as your health care partner,    Xuan Swenson MD

## 2021-09-26 ENCOUNTER — HEALTH MAINTENANCE LETTER (OUTPATIENT)
Age: 41
End: 2021-09-26

## 2022-03-07 ENCOUNTER — OFFICE VISIT (OUTPATIENT)
Dept: FAMILY MEDICINE | Facility: CLINIC | Age: 42
End: 2022-03-07
Payer: COMMERCIAL

## 2022-03-07 VITALS
HEART RATE: 60 BPM | OXYGEN SATURATION: 98 % | HEIGHT: 68 IN | TEMPERATURE: 96.1 F | DIASTOLIC BLOOD PRESSURE: 70 MMHG | WEIGHT: 193 LBS | SYSTOLIC BLOOD PRESSURE: 105 MMHG | BODY MASS INDEX: 29.25 KG/M2

## 2022-03-07 DIAGNOSIS — R63.5 WEIGHT GAIN: ICD-10-CM

## 2022-03-07 DIAGNOSIS — Z12.4 CERVICAL CANCER SCREENING: ICD-10-CM

## 2022-03-07 DIAGNOSIS — C43.9 SKIN CANCER (MELANOMA) (H): ICD-10-CM

## 2022-03-07 DIAGNOSIS — Z13.1 SCREENING FOR DIABETES MELLITUS: ICD-10-CM

## 2022-03-07 DIAGNOSIS — Z00.00 ROUTINE GENERAL MEDICAL EXAMINATION AT A HEALTH CARE FACILITY: Primary | ICD-10-CM

## 2022-03-07 DIAGNOSIS — Z13.220 LIPID SCREENING: ICD-10-CM

## 2022-03-07 DIAGNOSIS — Z12.31 ENCOUNTER FOR SCREENING MAMMOGRAM FOR BREAST CANCER: ICD-10-CM

## 2022-03-07 PROBLEM — J96.01 ACUTE HYPOXEMIC RESPIRATORY FAILURE DUE TO COVID-19 (H): Status: ACTIVE | Noted: 2022-03-07

## 2022-03-07 PROBLEM — J96.01 ACUTE HYPOXEMIC RESPIRATORY FAILURE DUE TO COVID-19 (H): Status: RESOLVED | Noted: 2022-03-07 | Resolved: 2022-03-07

## 2022-03-07 PROBLEM — U07.1 ACUTE HYPOXEMIC RESPIRATORY FAILURE DUE TO COVID-19 (H): Status: ACTIVE | Noted: 2022-03-07

## 2022-03-07 PROBLEM — U07.1 ACUTE HYPOXEMIC RESPIRATORY FAILURE DUE TO COVID-19 (H): Status: RESOLVED | Noted: 2022-03-07 | Resolved: 2022-03-07

## 2022-03-07 LAB
CHOLEST SERPL-MCNC: 119 MG/DL
FASTING STATUS PATIENT QL REPORTED: YES
FASTING STATUS PATIENT QL REPORTED: YES
GLUCOSE BLD-MCNC: 91 MG/DL (ref 70–99)
HDLC SERPL-MCNC: 37 MG/DL
LDLC SERPL CALC-MCNC: 66 MG/DL
NONHDLC SERPL-MCNC: 82 MG/DL
TRIGL SERPL-MCNC: 80 MG/DL
TSH SERPL DL<=0.005 MIU/L-ACNC: 1.7 MU/L (ref 0.4–4)

## 2022-03-07 PROCEDURE — 80061 LIPID PANEL: CPT | Performed by: NURSE PRACTITIONER

## 2022-03-07 PROCEDURE — 99396 PREV VISIT EST AGE 40-64: CPT | Performed by: NURSE PRACTITIONER

## 2022-03-07 PROCEDURE — G0145 SCR C/V CYTO,THINLAYER,RESCR: HCPCS | Performed by: NURSE PRACTITIONER

## 2022-03-07 PROCEDURE — 82947 ASSAY GLUCOSE BLOOD QUANT: CPT | Performed by: NURSE PRACTITIONER

## 2022-03-07 PROCEDURE — 36415 COLL VENOUS BLD VENIPUNCTURE: CPT | Performed by: NURSE PRACTITIONER

## 2022-03-07 PROCEDURE — 84443 ASSAY THYROID STIM HORMONE: CPT | Performed by: NURSE PRACTITIONER

## 2022-03-07 PROCEDURE — 87624 HPV HI-RISK TYP POOLED RSLT: CPT | Performed by: NURSE PRACTITIONER

## 2022-03-07 ASSESSMENT — ENCOUNTER SYMPTOMS
HEMATURIA: 0
JOINT SWELLING: 0
DYSURIA: 0
FEVER: 0
MYALGIAS: 0
ABDOMINAL PAIN: 0
CHILLS: 0
PARESTHESIAS: 0
EYE PAIN: 0
WEAKNESS: 0
HEMATOCHEZIA: 0
ARTHRALGIAS: 0
HEADACHES: 0
NERVOUS/ANXIOUS: 0
SHORTNESS OF BREATH: 0
SORE THROAT: 0
NAUSEA: 0
DIZZINESS: 0
PALPITATIONS: 0
COUGH: 0
CONSTIPATION: 0
DIARRHEA: 0
HEARTBURN: 0
FREQUENCY: 0

## 2022-03-07 ASSESSMENT — PAIN SCALES - GENERAL: PAINLEVEL: NO PAIN (0)

## 2022-03-07 NOTE — PATIENT INSTRUCTIONS
Call to schedule mammogram [phone].   Labs and pap today.  I will be in touch with results via [facility].        Preventive Health Recommendations  Female Ages 40 to 49    Yearly exam:     See your health care provider every year in order to  1. Review health changes.   2. Discuss preventive care.    3. Review your medicines if your doctor prescribed any.      Get a Pap test every three years (unless you have an abnormal result and your provider advises testing more often).      If you get Pap tests with HPV test, you only need to test every 5 years, unless you have an abnormal result. You do not need a Pap test if your uterus was removed (hysterectomy) and you have not had cancer.      You should be tested each year for STDs (sexually transmitted diseases), if you're at risk.     Ask your doctor if you should have a mammogram.      Have a colonoscopy (test for colon cancer) if someone in your family has had colon cancer or polyps before age 50.       Have a cholesterol test every 5 years.       Have a diabetes test (fasting glucose) after age 45. If you are at risk for diabetes, you should have this test every 3 years.    Shots: Get a flu shot each year. Get a tetanus shot every 10 years.     Nutrition:     Eat at least 5 servings of fruits and vegetables each day.    Eat whole-grain bread, whole-wheat pasta and brown rice instead of white grains and rice.    Get adequate Calcium and Vitamin D.      Lifestyle    Exercise at least 150 minutes a week (an average of 30 minutes a day, 5 days a week). This will help you control your weight and prevent disease.    Limit alcohol to one drink per day.    No smoking.     Wear sunscreen to prevent skin cancer.    See your dentist every six months for an exam and cleaning.

## 2022-03-07 NOTE — PROGRESS NOTES
SUBJECTIVE:   CC: Kimberli Rizo is an 41 year old woman who presents for preventive health visit.     {  Patient is dieting, started 12/2/2021.  Has lost about 3 lbs in the last month, but more weight prior to that.  Feels like she is at a standstill despite efforts.  Exercises 6-7 times per week, eating 1200 calories daily.   Drinking 100 mls of water per day.  Would like labs checked to make sure there isn't another cause for why she can't lose weight.     Periods are regular.  No vaginal concerns.     No hx of mammogram.  Would like to get this done.  Hx of breast cancer in maternal grandmother.      Hx of melanoma in situ, following with dermatology.          Needs biometric form completed for employer.        Patient has been advised of split billing requirements and indicates understanding: Yes  Healthy Habits:     Getting at least 3 servings of Calcium per day:  Yes    Bi-annual eye exam:  NO    Dental care twice a year:  NO    Sleep apnea or symptoms of sleep apnea:  Excessive snoring    Diet:  Other    Frequency of exercise:  6-7 days/week    Duration of exercise:  45-60 minutes    Taking medications regularly:  Yes    Medication side effects:  None    PHQ-2 Total Score: 0    Additional concerns today:  Yes        Today's PHQ-2 Score:   PHQ-2 ( 1999 [employer]) 3/7/2022   Q1: Little interest or pleasure in doing things 0   Q2: Feeling down, depressed or hopeless 0   PHQ-2 Score 0   Q1: Little interest or pleasure in doing things Not at all   Q2: Feeling down, depressed or hopeless Not at all   PHQ-2 Score 0       Abuse: Current or Past (Physical, Sexual or Emotional) - No  Do you feel safe in your environment? Yes    Have you ever done Advance Care Planning? (For example, a Health Directive, POLST, or a discussion with a medical provider or your loved ones about your wishes): Yes, patient states has an Advance Care Planning document and will bring a copy to the clinic.    Social History     Tobacco Use      Smoking status: Never Smoker     Smokeless tobacco: Never Used   Substance Use Topics     Alcohol use: Yes     Alcohol/week: 0.0 standard drinks     Comment: Social     If you drink alcohol do you typically have >3 drinks per day or >7 drinks per week? No    Alcohol Use 3/7/2022   Prescreen: >3 drinks/day or >7 drinks/week? No   Prescreen: >3 drinks/day or >7 drinks/week? -       Reviewed orders with patient.  Reviewed health maintenance and updated orders accordingly - Yes      Breast Cancer Screening:    FHS-7:   Breast CA Risk Assessment (FHS-7) 3/7/2022   Did any of your first-degree relatives have breast or ovarian cancer? Unknown   Did any of your relatives have bilateral breast cancer? Unknown   Did any man in your family have breast cancer? No   Did any woman in your family have breast and ovarian cancer? No   Did any woman in your family have breast cancer before age 50 y? No   Do you have 2 or more relatives with breast and/or ovarian cancer? No   Do you have 2 or more relatives with breast and/or bowel cancer? No       Mammogram Screening - Offered annual screening and updated Health Maintenance for mutual plan based on risk factor consideration    Pertinent mammograms are reviewed under the imaging tab.    History of abnormal Pap smear: NO - age 30-65 PAP every 5 years with negative HPV co-testing recommended  PAP / HPV Latest Ref Rng & Units 1/8/2016   PAP (Historical) - NIL   HPV16 NEG Negative   HPV18 NEG Negative   HRHPV NEG Negative     Reviewed and updated as needed this visit by clinical staff   Tobacco  Allergies  Meds  Problems  Med Hx  Surg Hx  Fam Hx          Reviewed and updated as needed this visit by Provider   Tobacco  Allergies  Meds  Problems  Med Hx  Surg Hx  Fam Hx             Review of Systems   Constitutional: Negative for chills and fever.   HENT: Negative for congestion, ear pain, hearing loss and sore throat.    Eyes: Negative for pain and visual disturbance.  "  Respiratory: Negative for cough and shortness of breath.    Cardiovascular: Negative for chest pain, palpitations and peripheral edema.   Gastrointestinal: Negative for abdominal pain, constipation, diarrhea, heartburn, hematochezia and nausea.   Genitourinary: Negative for dysuria, frequency, genital sores, hematuria and urgency.   Musculoskeletal: Negative for arthralgias, joint swelling and myalgias.   Skin: Negative for rash.   Neurological: Negative for dizziness, weakness, headaches and paresthesias.   Psychiatric/Behavioral: Negative for mood changes. The patient is not nervous/anxious.         OBJECTIVE:   /70   Pulse 60   Temp (!) 96.1  F (35.6  C)   Ht 1.727 m (5' 8\")   Wt 87.5 kg (193 lb)   SpO2 98%   BMI 29.35 kg/m    Physical Exam  GENERAL: healthy, alert and no distress  EYES: Eyes grossly normal to inspection, PERRL and conjunctivae and sclerae normal  HENT: ear canals and TM's normal, nose and mouth without ulcers or lesions  NECK: no adenopathy, no asymmetry, masses, or scars and thyroid normal to palpation  RESP: lungs clear to auscultation - no rales, rhonchi or wheezes  BREAST: normal without masses, tenderness or nipple discharge and no palpable axillary masses or adenopathy  CV: regular rate and rhythm, normal S1 S2, no S3 or S4, no murmur, click or rub, no peripheral edema and peripheral pulses strong  ABDOMEN: soft, nontender, no hepatosplenomegaly, no masses and bowel sounds normal   (female): normal female external genitalia, normal urethral meatus, vaginal mucosa pink, moist, well rugated, and normal cervix/adnexa/uterus without masses or discharge  MS: no gross musculoskeletal defects noted, no edema  SKIN: no suspicious lesions or rashes  NEURO: Normal strength and tone, mentation intact and speech normal  PSYCH: mentation appears normal, affect normal/bright    Diagnostic Test Results:  Labs reviewed in Epic    ASSESSMENT/PLAN:   (Z00.00) Routine general medical " "examination at a health care facility  (primary encounter diagnosis)  Comment:   Plan: continue annual exams    (C43.9) Skin cancer (melanoma) (H)  Comment: Chronic, stable.    Plan: per dermatology    (Z12.4) Cervical cancer screening  Comment: due for screening  Plan: Pap Screen with HPV - recommended age 30 - 65         Years          (Z13.220) Lipid screening  Comment:   Plan: Lipid panel reflex to direct LDL Fasting          (Z13.1) Screening for diabetes mellitus  Comment:   Plan: Glucose          (R63.5) Weight gain  Comment: More difficulty in losing weight, despite diet and exercise. Would like labs checked.     Plan: TSH with free T4 reflex          (Z12.31) Encounter for screening mammogram for breast cancer  Comment: no previous screening  Plan: *MA Screening Digital Bilateral            Patient has been advised of split billing requirements and indicates understanding: Yes    COUNSELING:  Reviewed preventive health counseling, as reflected in patient instructions    Estimated body mass index is 29.35 kg/m  as calculated from the following:    Height as of this encounter: 1.727 m (5' 8\").    Weight as of this encounter: 87.5 kg (193 lb).    Weight management plan: she is working on lifestyle measures    She reports that she has never smoked. She has never used smokeless tobacco.      Counseling Resources:  ATP IV Guidelines  Pooled Cohorts Equation Calculator  Breast Cancer Risk Calculator  BRCA-Related Cancer Risk Assessment: FHS-7 Tool  FRAX Risk Assessment  ICSI Preventive Guidelines  Dietary Guidelines for Americans, 2010  USDA's MyPlate  ASA Prophylaxis  Lung CA Screening    Salome Shaffer, CNP  [facility]  "

## 2022-03-09 LAB
BKR LAB AP GYN ADEQUACY: NORMAL
BKR LAB AP GYN INTERPRETATION: NORMAL
BKR LAB AP HPV REFLEX: NORMAL
BKR LAB AP PREVIOUS ABNORMAL: NORMAL
PATH REPORT.COMMENTS IMP SPEC: NORMAL
PATH REPORT.COMMENTS IMP SPEC: NORMAL
PATH REPORT.RELEVANT HX SPEC: NORMAL

## 2022-03-11 LAB
HUMAN PAPILLOMA VIRUS 16 DNA: NEGATIVE
HUMAN PAPILLOMA VIRUS 18 DNA: NEGATIVE
HUMAN PAPILLOMA VIRUS FINAL DIAGNOSIS: NORMAL
HUMAN PAPILLOMA VIRUS OTHER HR: NEGATIVE

## 2022-03-29 ENCOUNTER — OFFICE VISIT (OUTPATIENT)
Dept: URGENT CARE | Facility: URGENT CARE | Age: 42
End: 2022-03-29
Payer: COMMERCIAL

## 2022-03-29 VITALS
WEIGHT: 190.4 LBS | BODY MASS INDEX: 28.95 KG/M2 | OXYGEN SATURATION: 96 % | TEMPERATURE: 97.6 F | HEART RATE: 60 BPM | SYSTOLIC BLOOD PRESSURE: 117 MMHG | DIASTOLIC BLOOD PRESSURE: 74 MMHG

## 2022-03-29 DIAGNOSIS — L03.811 CELLULITIS AND ABSCESS OF HEAD: Primary | ICD-10-CM

## 2022-03-29 DIAGNOSIS — L02.811 CELLULITIS AND ABSCESS OF HEAD: Primary | ICD-10-CM

## 2022-03-29 PROCEDURE — 99213 OFFICE O/P EST LOW 20 MIN: CPT | Performed by: NURSE PRACTITIONER

## 2022-03-29 RX ORDER — SULFAMETHOXAZOLE/TRIMETHOPRIM 800-160 MG
1 TABLET ORAL 2 TIMES DAILY
Qty: 14 TABLET | Refills: 0 | Status: SHIPPED | OUTPATIENT
Start: 2022-03-29 | End: 2022-04-05

## 2022-12-08 ENCOUNTER — E-VISIT (OUTPATIENT)
Dept: FAMILY MEDICINE | Facility: CLINIC | Age: 42
End: 2022-12-08
Payer: COMMERCIAL

## 2022-12-08 DIAGNOSIS — R21 RASH: Primary | ICD-10-CM

## 2022-12-08 PROCEDURE — 99421 OL DIG E/M SVC 5-10 MIN: CPT | Performed by: NURSE PRACTITIONER

## 2022-12-08 RX ORDER — TRIAMCINOLONE ACETONIDE 1 MG/G
CREAM TOPICAL 2 TIMES DAILY
Qty: 30 G | Refills: 0 | Status: SHIPPED | OUTPATIENT
Start: 2022-12-08

## 2022-12-08 NOTE — PATIENT INSTRUCTIONS
Dear Kimberli Rizo    I have sent in a prescription for a topical steroid.  Please apply this twice daily as needed, not to exceed 14 days.  You can also take oral Zyrtec 10 mg daily until symptoms improved, can be purchased over the counter.   Please follow-up if not improving with these measures.      Thanks for choosing us as your health care partner,    Salome Shaffer, CNP

## 2023-04-23 ENCOUNTER — HEALTH MAINTENANCE LETTER (OUTPATIENT)
Age: 43
End: 2023-04-23

## 2023-12-03 ENCOUNTER — HEALTH MAINTENANCE LETTER (OUTPATIENT)
Age: 43
End: 2023-12-03

## 2024-06-30 ENCOUNTER — HEALTH MAINTENANCE LETTER (OUTPATIENT)
Age: 44
End: 2024-06-30

## 2024-07-12 ENCOUNTER — OFFICE VISIT (OUTPATIENT)
Dept: URGENT CARE | Facility: URGENT CARE | Age: 44
End: 2024-07-12
Payer: COMMERCIAL

## 2024-07-12 VITALS
TEMPERATURE: 98.5 F | OXYGEN SATURATION: 98 % | BODY MASS INDEX: 31.9 KG/M2 | RESPIRATION RATE: 17 BRPM | WEIGHT: 209.8 LBS | HEART RATE: 68 BPM | DIASTOLIC BLOOD PRESSURE: 73 MMHG | SYSTOLIC BLOOD PRESSURE: 111 MMHG

## 2024-07-12 DIAGNOSIS — M54.6 ACUTE RIGHT-SIDED THORACIC BACK PAIN: Primary | ICD-10-CM

## 2024-07-12 PROCEDURE — 99213 OFFICE O/P EST LOW 20 MIN: CPT

## 2024-07-12 RX ORDER — PREDNISONE 20 MG/1
20 TABLET ORAL DAILY
Qty: 5 TABLET | Refills: 0 | Status: SHIPPED | OUTPATIENT
Start: 2024-07-12 | End: 2024-07-17

## 2024-07-12 RX ORDER — CYCLOBENZAPRINE HCL 10 MG
10 TABLET ORAL 3 TIMES DAILY PRN
Qty: 30 TABLET | Refills: 0 | Status: SHIPPED | OUTPATIENT
Start: 2024-07-12

## 2024-07-12 NOTE — PATIENT INSTRUCTIONS
Diagnosis: Back sprain/strain - likely   But pcp can rule out any more serious pathology   Follow up with spine if still no explanation from pcp     Plan:   RICE: rest, ice / heat - alternating, gentle stretching  Ibuprofen and tylenol for pain today  Can try: creams and rubs- lidocaine, Voltaren   Lidocaine patches   Acute back pain from a sprain or strain usually gets better in 1 to 2 weeks.}   Back pain related to disk disease, arthritis in the spinal joints, or narrowing of the spinal canal  (spinal stenosis) can become chronic and last for months or years.}   Will refer you to spine speciality for further follow up and treatment regarding your back pain - make apt, if better can cancel, if not keep and try:   Physical therapy, acupuncture, chiropractic, injections,   Ortho: will discuss stronger medications such as narcotics   Monitor for:   Monitor for pain that worsens and does not improve over 2-6 weeks   Numbness and tingling down leg   Loss of function of bowel or bladder   New fevers, chills, sweats   Increased weakness or loss of strength in legs     Back Pain   Back pain is one of the most common problems. The good news is that most people feel better in 1 to 2 weeks, and most of the rest in 1 to 2 months.   Most people can remain active.  People who have pain describe it differently--not everyone is the same.  The pain can be sharp, stabbing, shooting, aching, cramping or burning.  Movement, standing, bending, lifting, sitting, or walking may worsen pain.  It can be limited to one spot or area, or it can be more generalized.  It can spread upwards, to the front, or go down your arms or legs (sciatica).  It can cause muscle spasm.  Most of the time, mechanical problems with the muscles or spine cause the pain.   Mechanical problems are usually caused by an injury to the muscles or ligaments.   Illness can cause back pain, but it's usually not caused by a serious illness.   Mechanical problems include:    Physical activity such as sports, exercise, work, or normal activity  Overexertion, lifting, pushing, pulling incorrectly or too aggressively  Sudden twisting, bending, or stretching from an accident, or accidental movement  Poor posture  Stretching or moving wrong, without noticing pain at the time  Poor coordination, lack of regular exercise (check with your doctor about this)  Spinal disc disease or arthritis  Stress  The pain can also be related to pregnancy, or illness like appendicitis, bladder or kidney infections, pelvic infections, and many other things.    Other things to try:   When in bed, try to find a position of comfort. A firm mattress is best. Try lying flat on your back with pillows under your knees. You can also try lying on your side with your  knees bent up toward your chest and a pillow between your knees.  Don't sit for long periods, as in a long car ride or during other travel. This puts more stress on the lower back than standing or walking.  You can alternate ice and heat therapy.   Therapeutic massage can help relax the back muscles without stretching them.  Be aware of safe lifting methods and don't lift anything without stretching first.

## 2024-07-12 NOTE — PROGRESS NOTES
URGENT CARE  Assessment & Plan   Assessment:   Kimberli Rizo is a 43 year old female who's clinical presentation today is consistent with:   1. Acute right-sided thoracic back pain  - Spine  Referral; Future  - Primary Care Referral; Future  - cyclobenzaprine (FLEXERIL) 10 MG tablet;   - predniSONE (DELTASONE) 20 MG tablet;   Plan:  will treat patient at this time symptomatically and supportively, this will include encouraging: using NSAIDs/Tylenol, muscle relaxants and steroids} to help decrease pain and inflammation, resting, applying ice as needed. Further encouraged creams and rubs such as lidocaine or Voltaren; patient is low risk cauda equina, unlikely spinal epidural abscess, patient does not appear to need advanced neuro imaging emergently, additionally x-rays are not indicated today given there was no accident or injury and I do not suspect a vertebral fracture; pain is near flank, however no CVA tenderness to palpation and no urinary symptoms, not suspicious for kidney involvement   We discussed that acute back pain from a strain or sprain usually gets better in 1-2 weeks but back pain from disc disease, arthritis or spinal canal narrowing can last much longer, for this reason I encouraged the patient to  follow-up with their PCP or spine speciality for further evaluation and treatment if no improvement in the next 2-3 weeks. However if symptoms worsen they should follow up immediately; educated them to monitor for worsening symptoms such as: new weakness, numbness or tingling; new fever/chills, loss of strength in legs; or loss of bowel or bladder function.   No alarm signs or symptoms present   Differential Diagnoses for this patient's chief complaint that I considered include:  vertebral fracture,  inflammatory process, spinal stenosis, dislocation, degenerative process, arthrodesis, disc disease, Spondyloarthropathy, ankylosing spondylitis, Radicular low back pain, lumbosacral  radiculopathy, cauda equina      Patient is} agreeable to treatment plan and state they will follow-up if symptoms do not improve and/or if symptoms worsen   see patient's AVS 'monitor for' section for specific patient instructions given and discussed regarding what to watch for and when to follow up    IVY Kim [facility]      ______________________________________________________________________      Subjective     HPI: Kimberli Rizo  is a 43 year old  female who presents today for evaluation the following concerns:   Patient presents with right sided mid-back pain which started 3 days ago   Patient denies any accident or injury, patient states the pain came on gradually with no impetus  Patient denies any urinary symptoms   Patient states this is acute.   Patient endorses pain that is not radiating down her legs    Patient denies any changes of bowel or bladder; denies difficulty with bowel movements, difficulty with urination  Patient denies any numbness or tingling   The patient has tried NSAIDs} to help alleviate the pain, and the helpful was minimal .   She denies associated abdominal pain, fevers   Patient denies any foot drop, or saddle anesthesia     Review of Systems:  Pertinent review of systems as reflected in HPI, otherwise negative.     Objective    Physical Exam:  Vitals:    07/12/24 1008   BP: 111/73   Pulse: 68   Resp: 17   Temp: 98.5  F (36.9  C)   TempSrc: Oral   SpO2: 98%   Weight: 95.2 kg (209 lb 12.8 oz)      General:   alert and oriented, no acute distress, non ill-appearing   Vital signs reviewed: afebrile and normotensive     GI/: wnl, normal   MSK: Motion intact, strength adequate for age  no cva tenderness to palpation   Back:      Inspection: normal posture, walking normal, gait normal         Palpation:  tenderness over thoracic  paraspinals on right       ROM: forward flexion to full range,       extension to full range, sidebending left to  full range, sidebending right to full range,       rotation right to full range, and rotation left to full range.      Straight leg raises: negative       Strength: +3/4 bilaterally       Sensation: normal.  Neuro:  motor, cerebellar function, gait and coordination are all within normal limits, no numbness noted  ______________________________________________________________________    I explained my diagnostic considerations and recommendations to the patient, who voiced understanding and agreement with the treatment plan.   All questions were answered.   We discussed potential side effects, risks and benefits of any prescribed or recommended therapies, as well as expectations for response to treatments.  Please see AVS for any patient instructions & handouts given.   Patient was advised to contact the Nurse Care Line, their Primary Care provider, Urgent Care, or the Emergency Department if there are new or worsening symptoms, or call 911 for emergencies.

## 2024-07-15 ENCOUNTER — OFFICE VISIT (OUTPATIENT)
Dept: FAMILY MEDICINE | Facility: CLINIC | Age: 44
End: 2024-07-15
Payer: COMMERCIAL

## 2024-07-15 VITALS
BODY MASS INDEX: 31.55 KG/M2 | SYSTOLIC BLOOD PRESSURE: 110 MMHG | RESPIRATION RATE: 18 BRPM | WEIGHT: 208.2 LBS | DIASTOLIC BLOOD PRESSURE: 73 MMHG | HEART RATE: 60 BPM | HEIGHT: 68 IN | TEMPERATURE: 96.3 F | OXYGEN SATURATION: 99 %

## 2024-07-15 DIAGNOSIS — N95.1 PERIMENOPAUSE: Primary | ICD-10-CM

## 2024-07-15 DIAGNOSIS — M54.50 ACUTE RIGHT-SIDED LOW BACK PAIN WITHOUT SCIATICA: ICD-10-CM

## 2024-07-15 PROCEDURE — 99214 OFFICE O/P EST MOD 30 MIN: CPT | Performed by: NURSE PRACTITIONER

## 2024-07-15 ASSESSMENT — ENCOUNTER SYMPTOMS: BACK PAIN: 1

## 2024-07-15 ASSESSMENT — PAIN SCALES - GENERAL: PAINLEVEL: NO PAIN (0)

## 2024-07-15 NOTE — PROGRESS NOTES
X   Assessment & Plan     Perimenopause  -Having the typical vasomotor symptoms of menopause plus more irregular periods. Discussed if vasomotor symptoms are bothersome she could consider starting MHT therapy. If having repeated episodes of significantly heavy uterine bleeding would recommend referral to OBGYN to discuss progesterone containing IUD vs uterine ablation. Hormone testing not indicated as it would not treatment decisions.  -Patient states her symptoms are not bothersome enough at this point to start pharmaceutical therapy.     Acute right-sided low back pain without sciatica  -Differentials considered include soft tissue injury of jesus, hip joint pathology, spinal stenosis, pyelonephritis. No other neurologic findings or symptoms to suggest MS as a cause of her back pain. Discussed that acute back pain is not a typical symptom of MS. Her symptoms have already improved greatly on prednisone. She did not care for the flexaril as it made her arms feel weak/heavy for almost 48 hours after taking it. Hip exam in clinic today normal.  -Suspect history of chronic left hip pain/ache could be causing her to subconsciously increase the workload on her right side leading to pain.   -No history of morning joint swelling or stiffness making RA less likely.  -Patient does not have any red flag symptoms including bowel or bladder incontinence, saddle anesthesia, or lower extremity weakness. Patient was instructed to go to the emergency department if he develops these symptoms.  -Avoid bed rest, increase daily stretches and activity as tolerated. Use ice during the first 48 hours as needed for pain, after that use heat. Recommend physical therapy if symptoms don't start to improve in the next 2 weeks.       -Encouraged to schedule a preventative care visit to go over full health history and screening.       BMI  Estimated body mass index is 31.66 kg/m  as calculated from the following:    Height as of this encounter:  "1.727 m (5' 8\").    Weight as of this encounter: 94.4 kg (208 lb 3.2 oz).             Subjective   Kimberli is a 43 year old, presenting for the following health issues:  RECHECK and Back Pain        7/15/2024     9:43 AM   Additional Questions   Roomed by Zehra DEL VALLE   Accompanied by self     Patient presents to clinic today with concerns for follow up after being seen in  for back pain. She states she normally wouldn't come in for this but her younger brother was recently diagnosed with MS and he also has a history of back pain. Describes pain as nagging right lower back pain that gradually worsened, got so tight it was spasming. Pain is through her hip but not radiating down her leg. Tried heating, massage, TENS unit, stretching before coming in.     Received flexaril and steroid prescription at , felt way better within 24 hours. Can still feel it.  Does not hurt to palpation.     She is also asking about perimenopause. She had gone 3 months without period then it came back and was very heavy. Did get it the next month. Experiencing hot flashes and night sweats.      Sits at a desk for work. Took flexaril for one dose next 48 hours felt weakness in arms and legs so she hasn't taken any further doses.         History of Present Illness       Back Pain:  She presents for follow up of back pain. Patient's back pain is a new problem.    Original cause of back pain: not sure  First noticed back pain: in the last week  Patient feels back pain: constantlyLocation of back pain:  Right lower back, right buttock, left hip and right side of waist  Description of back pain: cramping, dull ache and shooting  Back pain spreads: nowhere    Since patient first noticed back pain, pain is: gradually improving  Does back pain interfere with her job:  Yes  On a scale of 1-10 (10 being the worst), patient describes pain as:  2  What makes back pain worse: certain positions   Acupuncture: not tried  Acetaminophen: helpful  Activity " "or exercise: helpful  Chiropractor:  Not tried  Cold: not tried  Heat: helpful  Massage: helpful  Muscle relaxants: helpful  NSAIDS: helpful  Opioids: not tried  Physical Therapy: not tried  Rest: not helpful  Steroid Injection: not tried  Stretching: helpful  Surgery: not tried  TENS unit: helpful  Topical pain relievers: not tried  Other healthcare providers patient is seeing for back pain: None    She eats 0-1 servings of fruits and vegetables daily.She consumes 0 sweetened beverage(s) daily.She exercises with enough effort to increase her heart rate 10 to 19 minutes per day.  She exercises with enough effort to increase her heart rate 4 days per week.   She is taking medications regularly.         ED/UC Followup:    Facility:  [facility]   Date of visit: 07/12/2024  Reason for visit: Acute right-sided thoracic back pain  Current Status: stable         Objective    /73   Pulse 60   Temp (!) 96.3  F (35.7  C) (Tympanic)   Resp 18   Ht 1.727 m (5' 8\")   Wt 94.4 kg (208 lb 3.2 oz)   LMP 07/01/2024 (Approximate)   SpO2 99%   BMI 31.66 kg/m    Body mass index is 31.66 kg/m .  Physical Exam  Constitutional:       Appearance: Normal appearance. She is not ill-appearing.   HENT:      Right Ear: Tympanic membrane, ear canal and external ear normal.      Left Ear: Tympanic membrane, ear canal and external ear normal.      Nose: Nose normal. No congestion.      Mouth/Throat:      Mouth: Mucous membranes are moist.      Pharynx: Oropharynx is clear.   Eyes:      Pupils: Pupils are equal, round, and reactive to light.   Cardiovascular:      Rate and Rhythm: Normal rate and regular rhythm.      Pulses: Normal pulses.      Heart sounds: Normal heart sounds. No murmur heard.     No friction rub. No gallop.   Pulmonary:      Effort: Pulmonary effort is normal.      Breath sounds: Normal breath sounds.   Abdominal:      General: Abdomen is flat. Bowel sounds are normal.      Palpations: Abdomen is " soft.   Musculoskeletal:         General: Normal range of motion.      Cervical back: Normal range of motion.      Comments: Negative CARISSA   Negative windshield  Negative log roll  Negative straight leg     Lymphadenopathy:      Cervical: No cervical adenopathy.   Skin:     General: Skin is warm and dry.   Neurological:      General: No focal deficit present.      Mental Status: She is alert and oriented to person, place, and time.   Psychiatric:         Mood and Affect: Mood normal.         Behavior: Behavior normal.         Thought Content: Thought content normal.         Judgment: Judgment normal.                    Signed Electronically by: IVY Aguilar CNP

## 2024-11-04 ENCOUNTER — PATIENT OUTREACH (OUTPATIENT)
Dept: FAMILY MEDICINE | Facility: CLINIC | Age: 44
End: 2024-11-04
Payer: COMMERCIAL

## 2024-11-04 NOTE — TELEPHONE ENCOUNTER
Patient Quality Outreach    Patient is due for the following:   Colon Cancer Screening  Breast Cancer Screening - Mammogram  Physical Preventive Adult Physical      Topic Date Due    Hepatitis B Vaccine (1 of 3 - 19+ 3-dose series) Never done    Diptheria Tetanus Pertussis (DTAP/TDAP/TD) Vaccine (1 - Tdap) Never done    Flu Vaccine (1) Never done    COVID-19 Vaccine (3 - 2024-25 season) 09/01/2024       Next Steps:   Schedule a Adult Preventative    Type of outreach:    Sent clickTRUE message.      Questions for provider review:    None           ARRON SHERWOOD MA

## 2025-02-14 ENCOUNTER — ANCILLARY PROCEDURE (OUTPATIENT)
Dept: MAMMOGRAPHY | Facility: CLINIC | Age: 45
End: 2025-02-14
Payer: COMMERCIAL

## 2025-02-14 DIAGNOSIS — Z12.31 VISIT FOR SCREENING MAMMOGRAM: ICD-10-CM

## 2025-02-14 PROCEDURE — 77063 BREAST TOMOSYNTHESIS BI: CPT | Mod: TC | Performed by: RADIOLOGY

## 2025-02-14 PROCEDURE — 77067 SCR MAMMO BI INCL CAD: CPT | Mod: TC | Performed by: RADIOLOGY

## 2025-03-20 ENCOUNTER — OFFICE VISIT (OUTPATIENT)
Dept: FAMILY MEDICINE | Facility: CLINIC | Age: 45
End: 2025-03-20
Payer: COMMERCIAL

## 2025-03-20 VITALS
WEIGHT: 224.6 LBS | BODY MASS INDEX: 34.04 KG/M2 | DIASTOLIC BLOOD PRESSURE: 81 MMHG | HEART RATE: 67 BPM | SYSTOLIC BLOOD PRESSURE: 119 MMHG | RESPIRATION RATE: 18 BRPM | HEIGHT: 68 IN | TEMPERATURE: 97 F | OXYGEN SATURATION: 97 %

## 2025-03-20 DIAGNOSIS — B96.89 BACTERIAL VAGINOSIS: ICD-10-CM

## 2025-03-20 DIAGNOSIS — N89.8 BLOODY VAGINAL DISCHARGE: Primary | ICD-10-CM

## 2025-03-20 DIAGNOSIS — N76.0 BACTERIAL VAGINOSIS: ICD-10-CM

## 2025-03-20 LAB
ALBUMIN UR-MCNC: NEGATIVE MG/DL
APPEARANCE UR: CLEAR
BILIRUB UR QL STRIP: NEGATIVE
CLUE CELLS: PRESENT
COLOR UR AUTO: YELLOW
GLUCOSE UR STRIP-MCNC: NEGATIVE MG/DL
HCG UR QL: NEGATIVE
HGB UR QL STRIP: ABNORMAL
KETONES UR STRIP-MCNC: NEGATIVE MG/DL
LEUKOCYTE ESTERASE UR QL STRIP: NEGATIVE
NITRATE UR QL: NEGATIVE
PH UR STRIP: 5.5 [PH] (ref 5–7)
RBC #/AREA URNS AUTO: ABNORMAL /HPF
SP GR UR STRIP: 1.01 (ref 1–1.03)
SQUAMOUS #/AREA URNS AUTO: ABNORMAL /LPF
TRICHOMONAS, WET PREP: ABNORMAL
UROBILINOGEN UR STRIP-ACNC: 0.2 E.U./DL
WBC #/AREA URNS AUTO: ABNORMAL /HPF
WBC'S/HIGH POWER FIELD, WET PREP: ABNORMAL
YEAST, WET PREP: ABNORMAL

## 2025-03-20 PROCEDURE — 1126F AMNT PAIN NOTED NONE PRSNT: CPT | Performed by: NURSE PRACTITIONER

## 2025-03-20 PROCEDURE — 87210 SMEAR WET MOUNT SALINE/INK: CPT | Performed by: NURSE PRACTITIONER

## 2025-03-20 PROCEDURE — 99214 OFFICE O/P EST MOD 30 MIN: CPT | Performed by: NURSE PRACTITIONER

## 2025-03-20 PROCEDURE — 3074F SYST BP LT 130 MM HG: CPT | Performed by: NURSE PRACTITIONER

## 2025-03-20 PROCEDURE — 3079F DIAST BP 80-89 MM HG: CPT | Performed by: NURSE PRACTITIONER

## 2025-03-20 PROCEDURE — 81001 URINALYSIS AUTO W/SCOPE: CPT | Performed by: NURSE PRACTITIONER

## 2025-03-20 PROCEDURE — 81025 URINE PREGNANCY TEST: CPT | Performed by: NURSE PRACTITIONER

## 2025-03-20 RX ORDER — METRONIDAZOLE 500 MG/1
500 TABLET ORAL 2 TIMES DAILY
Qty: 14 TABLET | Refills: 0 | Status: SHIPPED | OUTPATIENT
Start: 2025-03-20 | End: 2025-03-27

## 2025-03-20 ASSESSMENT — PAIN SCALES - GENERAL: PAINLEVEL_OUTOF10: NO PAIN (0)

## 2025-03-20 NOTE — PROGRESS NOTES
"  Assessment & Plan     Bloody vaginal discharge  -Normal exam, wet prep positive for clue cells. Will treat for BV.  - UA Macroscopic with reflex to Microscopic and Culture - Lab Collect; Future  - Wet prep - lab collect; Future  - UA Macroscopic with reflex to Microscopic and Culture - Lab Collect  - Wet prep - lab collect  - HCG qualitative urine; Future  - HCG qualitative urine  - UA Microscopic with Reflex to Culture    Bacterial vaginosis  -avoid douching, use of harsh soaps or fragrances in vulva/vaginal area. Wear breathable underwear, consider sleeping without underwear.    - metroNIDAZOLE (FLAGYL) 500 MG tablet; Take 1 tablet (500 mg) by mouth 2 times daily for 7 days.          BMI  Estimated body mass index is 34.15 kg/m  as calculated from the following:    Height as of this encounter: 1.727 m (5' 8\").    Weight as of this encounter: 101.9 kg (224 lb 9.6 oz).             Subjective   Kimberli is a 44 year old, presenting for the following health issues:  Vaginal Problem        3/20/2025    11:41 AM   Additional Questions   Roomed by Zehra DEL VALLE   Accompanied by self     Week ago was having light vaginal bleeding, unusual smell. Light pink. 3 weeks light bleeding, notices it when wiping. Unsure if blood in urine.  Then started having left lower back spasms that radiates to the front.     Occasional nausea with back pain. No fevers. No stomach pain. No new sexual partners.  has had vasectomy 10 years ago.     History of Present Illness       Back Pain:  She presents for follow up of back pain. Patient's back pain is a recurring problem.  Location of back pain:  Left lower back and left middle of back  Description of back pain: dull ache  Back pain spreads: left buttocks    Since patient first noticed back pain, pain is: gradually improving  Does back pain interfere with her job:  Yes       Reason for visit:  Bleeding but unsure if menstruel or urine for weeks and left hip back groin spasms  Symptom " "onset:  3-4 weeks ago  Symptoms include:  Bleeding mild but spasm intense  Symptom intensity:  Moderate  Symptom progression:  Improving  Had these symptoms before:  No  What makes it worse:  Sir from stand or stand from sit  What makes it better:  Staying still   She is taking medications regularly.                      Objective    /81   Pulse 67   Temp 97  F (36.1  C) (Tympanic)   Resp 18   Ht 1.727 m (5' 8\")   Wt 101.9 kg (224 lb 9.6 oz)   SpO2 97%   BMI 34.15 kg/m    Body mass index is 34.15 kg/m .  Physical Exam  Constitutional:       Appearance: Normal appearance. She is not ill-appearing.   Cardiovascular:      Rate and Rhythm: Normal rate and regular rhythm.      Pulses: Normal pulses.      Heart sounds: Normal heart sounds. No murmur heard.     No friction rub. No gallop.   Pulmonary:      Effort: Pulmonary effort is normal.      Breath sounds: Normal breath sounds.   Abdominal:      General: Abdomen is flat. Bowel sounds are normal.      Palpations: Abdomen is soft.      Tenderness: There is no abdominal tenderness. There is no right CVA tenderness, left CVA tenderness or guarding.      Hernia: There is no hernia in the left inguinal area or right inguinal area.   Genitourinary:     Labia:         Right: No rash, tenderness, lesion or injury.         Left: No rash, tenderness, lesion or injury.       Urethra: No prolapse, urethral pain, urethral swelling or urethral lesion.      Vagina: Normal.      Cervix: Normal.   Musculoskeletal:      Cervical back: Normal range of motion and neck supple.   Lymphadenopathy:      Lower Body: No right inguinal adenopathy. No left inguinal adenopathy.   Skin:     General: Skin is warm and dry.   Neurological:      Mental Status: She is alert.   Psychiatric:         Attention and Perception: Attention and perception normal.         Mood and Affect: Mood and affect normal.         Speech: Speech normal.         Behavior: Behavior normal. Behavior is " cooperative.         Thought Content: Thought content normal.         Cognition and Memory: Cognition and memory normal.         Judgment: Judgment normal.            Results for orders placed or performed in visit on 03/20/25   UA Macroscopic with reflex to Microscopic and Culture - Lab Collect     Status: Abnormal    Specimen: Urine, Clean Catch   Result Value Ref Range    Color Urine Yellow Colorless, Straw, Light Yellow, Yellow    Appearance Urine Clear Clear    Glucose Urine Negative Negative mg/dL    Bilirubin Urine Negative Negative    Ketones Urine Negative Negative mg/dL    Specific Gravity Urine 1.010 1.003 - 1.035    Blood Urine Trace (A) Negative    pH Urine 5.5 5.0 - 7.0    Protein Albumin Urine Negative Negative mg/dL    Urobilinogen Urine 0.2 0.2, 1.0 E.U./dL    Nitrite Urine Negative Negative    Leukocyte Esterase Urine Negative Negative   HCG qualitative urine     Status: Normal   Result Value Ref Range    hCG Urine Qualitative Negative Negative   UA Microscopic with Reflex to Culture     Status: Abnormal   Result Value Ref Range    RBC Urine 0-2 0-2 /HPF /HPF    WBC Urine 0-5 0-5 /HPF /HPF    Squamous Epithelials Urine Few (A) None Seen /LPF    Narrative    Urine Culture not indicated   Wet prep - lab collect     Status: Abnormal    Specimen: Vagina; Swab   Result Value Ref Range    Trichomonas Absent Absent    Yeast Absent Absent    Clue Cells Present (A) Absent    WBCs/high power field 3+ (A) None           Signed Electronically by: IVY Aguilar CNP

## 2025-03-29 ENCOUNTER — OFFICE VISIT (OUTPATIENT)
Dept: ORTHOPEDICS | Facility: CLINIC | Age: 45
End: 2025-03-29
Payer: COMMERCIAL

## 2025-03-29 ENCOUNTER — ANCILLARY PROCEDURE (OUTPATIENT)
Dept: GENERAL RADIOLOGY | Facility: CLINIC | Age: 45
End: 2025-03-29
Attending: PEDIATRICS
Payer: COMMERCIAL

## 2025-03-29 VITALS — BODY MASS INDEX: 33.34 KG/M2 | WEIGHT: 220 LBS | HEIGHT: 68 IN

## 2025-03-29 DIAGNOSIS — M54.50 ACUTE LEFT-SIDED LOW BACK PAIN WITHOUT SCIATICA: Primary | ICD-10-CM

## 2025-03-29 DIAGNOSIS — M25.552 LEFT HIP PAIN: ICD-10-CM

## 2025-03-29 PROCEDURE — 1125F AMNT PAIN NOTED PAIN PRSNT: CPT | Performed by: PEDIATRICS

## 2025-03-29 PROCEDURE — 99204 OFFICE O/P NEW MOD 45 MIN: CPT | Performed by: PEDIATRICS

## 2025-03-29 PROCEDURE — 73501 X-RAY EXAM HIP UNI 1 VIEW: CPT | Mod: TC | Performed by: RADIOLOGY

## 2025-03-29 RX ORDER — PREDNISONE 20 MG/1
20 TABLET ORAL DAILY
Qty: 5 TABLET | Refills: 0 | Status: SHIPPED | OUTPATIENT
Start: 2025-03-29 | End: 2025-04-03

## 2025-03-29 RX ORDER — PREDNISONE 20 MG/1
20 TABLET ORAL DAILY
Status: CANCELLED | OUTPATIENT
Start: 2025-03-29

## 2025-03-29 ASSESSMENT — PAIN SCALES - GENERAL: PAINLEVEL_OUTOF10: MODERATE PAIN (5)

## 2025-03-29 NOTE — PATIENT INSTRUCTIONS
Left low back, flank, hip area musculoskeletal pain favored to be coming more from the low back, given exam findings with extension and left lateral bending reproducing pain.  Also discussed potential for SI joint component.  Does not appear to be hip joint related based on exam or x-rays.  We discussed considerations around additional imaging such as with lumbar MRI, rehab approach, use of medication for symptoms.  Following discussion, home exercises reviewed today, with upcoming travel.  If interested in referral to physical therapy, contact clinic.  With medications, discussed use of muscle relaxant, which you may use if desired, but I do not think likely to get out underlying source.  Placed prescription for oral prednisone, as I suspect there may be some facet joint involvement and with lack of benefit from NSAIDs.  As above, plan to monitor course 3 to 4 weeks.  If any worsening or changing symptoms in the meantime, contact clinic.    If you have any further questions for your physician or physician s care team you can contact them thru [employer] or by calling [phone].

## 2025-03-29 NOTE — LETTER
3/29/2025      Kimberli Rizo  [id number] [address] [id number]      Dear Colleague,    Thank you for referring your patient, Kimberli Rizo, to the [facility]. Please see a copy of my visit note below.    ASSESSMENT & PLAN    Kimberli was seen today for pain.    Diagnoses and all orders for this visit:    Acute left-sided low back pain without sciatica  -     predniSONE (DELTASONE) 20 MG tablet; Take 1 tablet (20 mg) by mouth daily for 5 days.    Left hip pain  -     XR Hip Left 1 View; Future  -     predniSONE (DELTASONE) 20 MG tablet; Take 1 tablet (20 mg) by mouth daily for 5 days.          See Patient Instructions  Patient Instructions   Left low back, flank, hip area musculoskeletal pain favored to be coming more from the low back, given exam findings with extension and left lateral bending reproducing pain.  Also discussed potential for SI joint component.  Does not appear to be hip joint related based on exam or x-rays.  We discussed considerations around additional imaging such as with lumbar MRI, rehab approach, use of medication for symptoms.  Following discussion, home exercises reviewed today, with upcoming travel.  If interested in referral to physical therapy, contact clinic.  With medications, discussed use of muscle relaxant, which you may use if desired, but I do not think likely to get out underlying source.  Placed prescription for oral prednisone, as I suspect there may be some facet joint involvement and with lack of benefit from NSAIDs.  As above, plan to monitor course 3 to 4 weeks.  If any worsening or changing symptoms in the meantime, contact clinic.    If you have any further questions for your physician or physician s care team you can contact them thru MyChart or by calling [phone].      Alistair Doty,   [facility]    -----  Chief Complaint   Patient presents with     Left Hip - Pain        SUBJECTIVE  Kimberli Rizo is a/an 44 year old female who is seen as a WALK IN patient for evaluation of left hip/ back pain.     The patient is seen by themselves.  The patient is Right handed    Onset: 3 week(s) ago. Reports insidious onset without acute precipitating event.  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  Additional history:  Pain in left hip area, diffuse about lateral aspect.  Pain near left SI joint area, can go anteriorly to anterior pelvis, ASIS area.  No radiation to left LE.  Recalls around time of onset of this pain had done car ride. Noted during that time if sitting with better posture, more lumbar support, pain improved.    This past July had some back pain, responded well to oral steroid and muscle relaxant.      REVIEW OF SYSTEMS:  Review of Systems    OBJECTIVE:        Low back exam:    Inspection:     no visible deformity in the low back       normal skin       normal vascular       normal lymphatic    ROM:     flexion with hands to near toes, no change; has some pain with returning to upright from flexed position  Extension mod limitation with pain reproduced  Min pain, some stretching right lateral bending  Pain and limited motion with left lateral bending    Tender: mild left SI joint    LE strength grossly intact    Special tests:     straight leg raise left brief low back  pain, no radicular symptoms        Some pain with CARISSA        slump test with some low back and hip pain, no radicular symptoms               Left hip exam    ROM:     Flexion        internal rotation       external rotation   Grossly intact, mild lateral pain with motion    Special Tests:      neg (-) FADIR       Log roll neg      RADIOLOGY:  Final results and radiologist's interpretation, available in the [facility] health record.  Images were reviewed with the patient in the office today.  My personal interpretation of the performed imaging: no acute bony abnormality noted. Left hip joint space appears preserved.      XR Hip Left 1 View    Narrative    EXAM: XR HIP LEFT 1 VIEW  LOCATION: [facility] [place]  DATE: 3/29/2025    INDICATION:  Left hip pain  COMPARISON: None.      Impression    IMPRESSION: Normal joint spaces and alignment. No fracture.               Again, thank you for allowing me to participate in the care of your patient.        Sincerely,        Alistair Doty DO    Electronically signed

## 2025-03-29 NOTE — PROGRESS NOTES
ASSESSMENT & PLAN    Kimberli was seen today for pain.    Diagnoses and all orders for this visit:    Acute left-sided low back pain without sciatica  -     predniSONE (DELTASONE) 20 MG tablet; Take 1 tablet (20 mg) by mouth daily for 5 days.    Left hip pain  -     XR Hip Left 1 View; Future  -     predniSONE (DELTASONE) 20 MG tablet; Take 1 tablet (20 mg) by mouth daily for 5 days.          See Patient Instructions  Patient Instructions   Left low back, flank, hip area musculoskeletal pain favored to be coming more from the low back, given exam findings with extension and left lateral bending reproducing pain.  Also discussed potential for SI joint component.  Does not appear to be hip joint related based on exam or x-rays.  We discussed considerations around additional imaging such as with lumbar MRI, rehab approach, use of medication for symptoms.  Following discussion, home exercises reviewed today, with upcoming travel.  If interested in referral to physical therapy, contact clinic.  With medications, discussed use of muscle relaxant, which you may use if desired, but I do not think likely to get out underlying source.  Placed prescription for oral prednisone, as I suspect there may be some facet joint involvement and with lack of benefit from NSAIDs.  As above, plan to monitor course 3 to 4 weeks.  If any worsening or changing symptoms in the meantime, contact clinic.    If you have any further questions for your physician or physician s care team you can contact them thru MyChart or by calling [phone].      Alistair Doty DO  [facility]    -----  Chief Complaint   Patient presents with    Left Hip - Pain       SUBJECTIVE  Kimberli Rizo is a/an 44 year old female who is seen as a WALK IN patient for evaluation of left hip/ back pain.     The patient is seen by themselves.  The patient is Right handed    Onset: 3 week(s) ago. Reports insidious onset without  acute precipitating event.  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  Additional history:  Pain in left hip area, diffuse about lateral aspect.  Pain near left SI joint area, can go anteriorly to anterior pelvis, ASIS area.  No radiation to left LE.  Recalls around time of onset of this pain had done car ride. Noted during that time if sitting with better posture, more lumbar support, pain improved.    This past July had some back pain, responded well to oral steroid and muscle relaxant.      REVIEW OF SYSTEMS:  Review of Systems    OBJECTIVE:        Low back exam:    Inspection:     no visible deformity in the low back       normal skin       normal vascular       normal lymphatic    ROM:     flexion with hands to near toes, no change; has some pain with returning to upright from flexed position  Extension mod limitation with pain reproduced  Min pain, some stretching right lateral bending  Pain and limited motion with left lateral bending    Tender: mild left SI joint    LE strength grossly intact    Special tests:     straight leg raise left brief low back pain, no radicular symptoms        Some pain with CARISSA        slump test with some low back and hip pain, no radicular symptoms               Left hip exam    ROM:     Flexion        internal rotation       external rotation   Grossly intact, mild lateral pain  with motion    Special Tests:      neg (-) FADIR       Log roll neg      RADIOLOGY:  Final results and radiologist's interpretation, available in the [facility] health record.  Images were reviewed with the patient in the office today.  My personal interpretation of the performed imaging: no acute bony abnormality noted. Left hip joint space appears preserved.      XR Hip Left 1 View    Narrative    EXAM: XR HIP LEFT 1 VIEW  LOCATION: [facility]  DATE: 3/29/2025    INDICATION:  Left hip pain  COMPARISON: None.      Impression    IMPRESSION: Normal joint spaces and alignment. No fracture.

## 2025-07-12 ENCOUNTER — E-VISIT (OUTPATIENT)
Dept: URGENT CARE | Facility: CLINIC | Age: 45
End: 2025-07-12
Payer: COMMERCIAL

## 2025-07-12 DIAGNOSIS — Z91.89 RISK OF EXPOSURE TO LYME DISEASE: Primary | ICD-10-CM

## 2025-07-12 RX ORDER — DOXYCYCLINE 100 MG/1
200 CAPSULE ORAL ONCE
Qty: 2 CAPSULE | Refills: 0 | Status: SHIPPED | OUTPATIENT
Start: 2025-07-12 | End: 2025-07-12

## 2025-07-13 ENCOUNTER — HEALTH MAINTENANCE LETTER (OUTPATIENT)
Age: 45
End: 2025-07-13

## 2025-07-13 NOTE — PATIENT INSTRUCTIONS
Dear Kimberli Rizo    Rx at pharmacy  1. Risk of exposure to Lyme disease (Primary)    - doxycycline hyclate (VIBRAMYCIN) 100 MG capsule; Take 2 capsules (200 mg) by mouth once for 1 dose.  Dispense: 2 capsule; Refill: 0      Thanks for choosing us as your health care partner,    Deanne Hendricks PA-C